# Patient Record
Sex: MALE | Race: WHITE | Employment: FULL TIME
[De-identification: names, ages, dates, MRNs, and addresses within clinical notes are randomized per-mention and may not be internally consistent; named-entity substitution may affect disease eponyms.]

---

## 2017-01-04 ENCOUNTER — HOME CARE VISIT (OUTPATIENT)
Dept: SCHEDULING | Facility: HOME HEALTH | Age: 52
End: 2017-01-04
Payer: COMMERCIAL

## 2017-01-04 PROCEDURE — G0299 HHS/HOSPICE OF RN EA 15 MIN: HCPCS

## 2017-01-08 VITALS
HEART RATE: 78 BPM | SYSTOLIC BLOOD PRESSURE: 128 MMHG | DIASTOLIC BLOOD PRESSURE: 76 MMHG | TEMPERATURE: 97.8 F | RESPIRATION RATE: 16 BRPM | OXYGEN SATURATION: 97 %

## 2017-02-13 ENCOUNTER — HOSPITAL ENCOUNTER (OUTPATIENT)
Dept: PREADMISSION TESTING | Age: 52
Discharge: HOME | End: 2017-02-13
Payer: COMMERCIAL

## 2017-02-13 VITALS
TEMPERATURE: 97.9 F | HEIGHT: 67 IN | HEART RATE: 80 BPM | DIASTOLIC BLOOD PRESSURE: 76 MMHG | WEIGHT: 264.25 LBS | SYSTOLIC BLOOD PRESSURE: 108 MMHG | BODY MASS INDEX: 41.47 KG/M2

## 2017-02-13 LAB
ABO + RH BLD: NORMAL
ANION GAP BLD CALC-SCNC: 6 MMOL/L (ref 5–15)
APPEARANCE UR: CLEAR
ATRIAL RATE: 68 BPM
BACTERIA URNS QL MICRO: NEGATIVE /HPF
BILIRUB UR QL: NEGATIVE
BLOOD GROUP ANTIBODIES SERPL: NORMAL
BUN SERPL-MCNC: 11 MG/DL (ref 6–20)
BUN/CREAT SERPL: 14 (ref 12–20)
CALCIUM SERPL-MCNC: 8.7 MG/DL (ref 8.5–10.1)
CALCULATED P AXIS, ECG09: 42 DEGREES
CALCULATED R AXIS, ECG10: 46 DEGREES
CALCULATED T AXIS, ECG11: 39 DEGREES
CHLORIDE SERPL-SCNC: 103 MMOL/L (ref 97–108)
CO2 SERPL-SCNC: 28 MMOL/L (ref 21–32)
COLOR UR: NORMAL
CREAT SERPL-MCNC: 0.78 MG/DL (ref 0.7–1.3)
DIAGNOSIS, 93000: NORMAL
EPITH CASTS URNS QL MICRO: NORMAL /LPF
ERYTHROCYTE [DISTWIDTH] IN BLOOD BY AUTOMATED COUNT: 15.1 % (ref 11.5–14.5)
EST. AVERAGE GLUCOSE BLD GHB EST-MCNC: 114 MG/DL
GLUCOSE SERPL-MCNC: 132 MG/DL (ref 65–100)
GLUCOSE UR STRIP.AUTO-MCNC: NEGATIVE MG/DL
HBA1C MFR BLD: 5.6 % (ref 4.2–6.3)
HCT VFR BLD AUTO: 40.9 % (ref 36.6–50.3)
HGB BLD-MCNC: 13.2 G/DL (ref 12.1–17)
HGB UR QL STRIP: NEGATIVE
HYALINE CASTS URNS QL MICRO: NORMAL /LPF (ref 0–5)
INR PPP: 1 (ref 0.9–1.1)
KETONES UR QL STRIP.AUTO: NEGATIVE MG/DL
LEUKOCYTE ESTERASE UR QL STRIP.AUTO: NEGATIVE
MCH RBC QN AUTO: 26.4 PG (ref 26–34)
MCHC RBC AUTO-ENTMCNC: 32.3 G/DL (ref 30–36.5)
MCV RBC AUTO: 81.8 FL (ref 80–99)
NITRITE UR QL STRIP.AUTO: NEGATIVE
P-R INTERVAL, ECG05: 166 MS
PH UR STRIP: 6.5 [PH] (ref 5–8)
PLATELET # BLD AUTO: 231 K/UL (ref 150–400)
POTASSIUM SERPL-SCNC: 3.9 MMOL/L (ref 3.5–5.1)
PROT UR STRIP-MCNC: NEGATIVE MG/DL
PROTHROMBIN TIME: 10.3 SEC (ref 9–11.1)
Q-T INTERVAL, ECG07: 414 MS
QRS DURATION, ECG06: 110 MS
QTC CALCULATION (BEZET), ECG08: 440 MS
RBC # BLD AUTO: 5 M/UL (ref 4.1–5.7)
RBC #/AREA URNS HPF: NORMAL /HPF (ref 0–5)
SODIUM SERPL-SCNC: 137 MMOL/L (ref 136–145)
SP GR UR REFRACTOMETRY: 1.02 (ref 1–1.03)
SPECIMEN EXP DATE BLD: NORMAL
UA: UC IF INDICATED,UAUC: NORMAL
UROBILINOGEN UR QL STRIP.AUTO: 0.2 EU/DL (ref 0.2–1)
VENTRICULAR RATE, ECG03: 68 BPM
WBC # BLD AUTO: 6.2 K/UL (ref 4.1–11.1)
WBC URNS QL MICRO: NORMAL /HPF (ref 0–4)

## 2017-02-13 PROCEDURE — 81001 URINALYSIS AUTO W/SCOPE: CPT | Performed by: ORTHOPAEDIC SURGERY

## 2017-02-13 PROCEDURE — 83036 HEMOGLOBIN GLYCOSYLATED A1C: CPT | Performed by: ORTHOPAEDIC SURGERY

## 2017-02-13 PROCEDURE — 85027 COMPLETE CBC AUTOMATED: CPT | Performed by: ORTHOPAEDIC SURGERY

## 2017-02-13 PROCEDURE — 93005 ELECTROCARDIOGRAM TRACING: CPT

## 2017-02-13 PROCEDURE — 80048 BASIC METABOLIC PNL TOTAL CA: CPT | Performed by: ORTHOPAEDIC SURGERY

## 2017-02-13 PROCEDURE — 85610 PROTHROMBIN TIME: CPT | Performed by: ORTHOPAEDIC SURGERY

## 2017-02-13 PROCEDURE — 86900 BLOOD TYPING SEROLOGIC ABO: CPT | Performed by: ORTHOPAEDIC SURGERY

## 2017-02-13 NOTE — PERIOP NOTES
PREOPERATIVE INSTRUCTIONS REVIEWED WITH PATIENT. PATIENT GIVEN SIX PACKS OF CHG WIPES. INSTRUCTIONS (REVIEWED) ON USE OF CHG WIPES. PATIENT GIVEN SSI INFECTION SHEET. MRSA/MMSA TREATMENT INSTRUCTION SHEET GIVEN WITH AN EXPLANATION TO PATIENT THAT THEY WILL BE NOTIFIED IF TREATMENT INSTRUCTIONS NEED TO BE INITIATED. PATIENT WAS GIVEN THE OPPORTUNITY TO ASK QUESTIONS ON THE INFORMATION PROVIDED.

## 2017-02-14 LAB
BACTERIA SPEC CULT: NORMAL
BACTERIA SPEC CULT: NORMAL
SERVICE CMNT-IMP: NORMAL

## 2017-02-16 RX ORDER — DEXAMETHASONE SODIUM PHOSPHATE 100 MG/10ML
10 INJECTION INTRAMUSCULAR; INTRAVENOUS ONCE
Status: CANCELLED | OUTPATIENT
Start: 2017-02-20 | End: 2017-02-20

## 2017-02-16 RX ORDER — CEFAZOLIN SODIUM IN 0.9 % NACL 2 G/50 ML
2 INTRAVENOUS SOLUTION, PIGGYBACK (ML) INTRAVENOUS ONCE
Status: CANCELLED | OUTPATIENT
Start: 2017-02-20 | End: 2017-02-20

## 2017-02-16 RX ORDER — CELECOXIB 200 MG/1
200 CAPSULE ORAL ONCE
Status: CANCELLED | OUTPATIENT
Start: 2017-02-20 | End: 2017-02-20

## 2017-02-16 RX ORDER — PREGABALIN 75 MG/1
75 CAPSULE ORAL ONCE
Status: CANCELLED | OUTPATIENT
Start: 2017-02-20 | End: 2017-02-20

## 2017-02-16 RX ORDER — ACETAMINOPHEN 500 MG
1000 TABLET ORAL ONCE
Status: CANCELLED | OUTPATIENT
Start: 2017-02-20 | End: 2017-02-20

## 2017-02-20 ENCOUNTER — APPOINTMENT (OUTPATIENT)
Dept: GENERAL RADIOLOGY | Age: 52
DRG: 467 | End: 2017-02-20
Attending: ORTHOPAEDIC SURGERY
Payer: COMMERCIAL

## 2017-02-20 ENCOUNTER — HOSPITAL ENCOUNTER (INPATIENT)
Age: 52
LOS: 1 days | Discharge: HOME | DRG: 467 | End: 2017-02-21
Attending: ORTHOPAEDIC SURGERY | Admitting: ORTHOPAEDIC SURGERY
Payer: COMMERCIAL

## 2017-02-20 ENCOUNTER — ANESTHESIA (OUTPATIENT)
Dept: SURGERY | Age: 52
DRG: 467 | End: 2017-02-20
Payer: COMMERCIAL

## 2017-02-20 ENCOUNTER — ANESTHESIA EVENT (OUTPATIENT)
Dept: SURGERY | Age: 52
DRG: 467 | End: 2017-02-20
Payer: COMMERCIAL

## 2017-02-20 PROBLEM — T84.51XA INFECTION OF RIGHT PROSTHETIC HIP JOINT (HCC): Status: ACTIVE | Noted: 2017-02-20

## 2017-02-20 LAB
GLUCOSE BLD STRIP.AUTO-MCNC: 89 MG/DL (ref 65–100)
SERVICE CMNT-IMP: NORMAL

## 2017-02-20 PROCEDURE — 77030031139 HC SUT VCRL2 J&J -A: Performed by: ORTHOPAEDIC SURGERY

## 2017-02-20 PROCEDURE — C1776 JOINT DEVICE (IMPLANTABLE): HCPCS | Performed by: ORTHOPAEDIC SURGERY

## 2017-02-20 PROCEDURE — 74011250637 HC RX REV CODE- 250/637: Performed by: ORTHOPAEDIC SURGERY

## 2017-02-20 PROCEDURE — 65270000029 HC RM PRIVATE

## 2017-02-20 PROCEDURE — 77030012935 HC DRSG AQUACEL BMS -B: Performed by: ORTHOPAEDIC SURGERY

## 2017-02-20 PROCEDURE — 74011250636 HC RX REV CODE- 250/636

## 2017-02-20 PROCEDURE — 73501 X-RAY EXAM HIP UNI 1 VIEW: CPT

## 2017-02-20 PROCEDURE — 77030007866 HC KT SPN ANES BBMI -B: Performed by: ANESTHESIOLOGY

## 2017-02-20 PROCEDURE — 77030020788: Performed by: ORTHOPAEDIC SURGERY

## 2017-02-20 PROCEDURE — 87205 SMEAR GRAM STAIN: CPT | Performed by: ORTHOPAEDIC SURGERY

## 2017-02-20 PROCEDURE — C9290 INJ, BUPIVACAINE LIPOSOME: HCPCS | Performed by: ORTHOPAEDIC SURGERY

## 2017-02-20 PROCEDURE — 74011000258 HC RX REV CODE- 258: Performed by: ORTHOPAEDIC SURGERY

## 2017-02-20 PROCEDURE — 74011250636 HC RX REV CODE- 250/636: Performed by: ANESTHESIOLOGY

## 2017-02-20 PROCEDURE — 77030018846 HC SOL IRR STRL H20 ICUM -A: Performed by: ORTHOPAEDIC SURGERY

## 2017-02-20 PROCEDURE — 77030020061 HC IV BLD WRMR ADMIN SET 3M -B: Performed by: ANESTHESIOLOGY

## 2017-02-20 PROCEDURE — 74011000250 HC RX REV CODE- 250

## 2017-02-20 PROCEDURE — 74011250636 HC RX REV CODE- 250/636: Performed by: ORTHOPAEDIC SURGERY

## 2017-02-20 PROCEDURE — 87075 CULTR BACTERIA EXCEPT BLOOD: CPT | Performed by: ORTHOPAEDIC SURGERY

## 2017-02-20 PROCEDURE — 77030011640 HC PAD GRND REM COVD -A: Performed by: ORTHOPAEDIC SURGERY

## 2017-02-20 PROCEDURE — 77030012406 HC DRN WND PENRS BARD -A: Performed by: ORTHOPAEDIC SURGERY

## 2017-02-20 PROCEDURE — 77030008467 HC STPLR SKN COVD -B: Performed by: ORTHOPAEDIC SURGERY

## 2017-02-20 PROCEDURE — 77030013079 HC BLNKT BAIR HGGR 3M -A: Performed by: ANESTHESIOLOGY

## 2017-02-20 PROCEDURE — 0SR90JA REPLACEMENT OF RIGHT HIP JOINT WITH SYNTHETIC SUBSTITUTE, UNCEMENTED, OPEN APPROACH: ICD-10-PCS | Performed by: ORTHOPAEDIC SURGERY

## 2017-02-20 PROCEDURE — 74011000250 HC RX REV CODE- 250: Performed by: ORTHOPAEDIC SURGERY

## 2017-02-20 PROCEDURE — 0SP908Z REMOVAL OF SPACER FROM RIGHT HIP JOINT, OPEN APPROACH: ICD-10-PCS | Performed by: ORTHOPAEDIC SURGERY

## 2017-02-20 PROCEDURE — 77030004473: Performed by: ORTHOPAEDIC SURGERY

## 2017-02-20 PROCEDURE — 77030010507 HC ADH SKN DERMBND J&J -B: Performed by: ORTHOPAEDIC SURGERY

## 2017-02-20 PROCEDURE — 77030006783 HC BLD SAW OSC MCRA -A: Performed by: ORTHOPAEDIC SURGERY

## 2017-02-20 PROCEDURE — 76210000006 HC OR PH I REC 0.5 TO 1 HR: Performed by: ORTHOPAEDIC SURGERY

## 2017-02-20 PROCEDURE — 76060000037 HC ANESTHESIA 3 TO 3.5 HR: Performed by: ORTHOPAEDIC SURGERY

## 2017-02-20 PROCEDURE — 76010000173 HC OR TIME 3 TO 3.5 HR INTENSV-TIER 1: Performed by: ORTHOPAEDIC SURGERY

## 2017-02-20 PROCEDURE — 77030018836 HC SOL IRR NACL ICUM -A: Performed by: ORTHOPAEDIC SURGERY

## 2017-02-20 PROCEDURE — 77030034850: Performed by: ORTHOPAEDIC SURGERY

## 2017-02-20 PROCEDURE — 82962 GLUCOSE BLOOD TEST: CPT

## 2017-02-20 PROCEDURE — 77030018074 HC RTVR SUT ARTH4 S&N -B: Performed by: ORTHOPAEDIC SURGERY

## 2017-02-20 PROCEDURE — 77030032490 HC SLV COMPR SCD KNE COVD -B

## 2017-02-20 PROCEDURE — 77030011264 HC ELECTRD BLD EXT COVD -A: Performed by: ORTHOPAEDIC SURGERY

## 2017-02-20 DEVICE — SCREW BNE L25MM DIA6.5MM STD CANC HIP TI ST CANN NONLOCKING: Type: IMPLANTABLE DEVICE | Site: HIP | Status: FUNCTIONAL

## 2017-02-20 DEVICE — HEAD FEM DELT V40 -2.5MM 36MM -- V40 BIOLOX: Type: IMPLANTABLE DEVICE | Site: HIP | Status: FUNCTIONAL

## 2017-02-20 RX ORDER — TRANEXAMIC ACID 100 MG/ML
INJECTION, SOLUTION INTRAVENOUS AS NEEDED
Status: DISCONTINUED | OUTPATIENT
Start: 2017-02-20 | End: 2017-02-20 | Stop reason: HOSPADM

## 2017-02-20 RX ORDER — FAMOTIDINE 20 MG/1
20 TABLET, FILM COATED ORAL 2 TIMES DAILY
Status: DISCONTINUED | OUTPATIENT
Start: 2017-02-20 | End: 2017-02-21 | Stop reason: HOSPADM

## 2017-02-20 RX ORDER — OXYCODONE HYDROCHLORIDE 5 MG/1
5-10 TABLET ORAL
Status: DISCONTINUED | OUTPATIENT
Start: 2017-02-20 | End: 2017-02-21 | Stop reason: HOSPADM

## 2017-02-20 RX ORDER — PREGABALIN 75 MG/1
75 CAPSULE ORAL ONCE
Status: COMPLETED | OUTPATIENT
Start: 2017-02-20 | End: 2017-02-20

## 2017-02-20 RX ORDER — BUPIVACAINE HYDROCHLORIDE 5 MG/ML
INJECTION, SOLUTION EPIDURAL; INTRACAUDAL AS NEEDED
Status: DISCONTINUED | OUTPATIENT
Start: 2017-02-20 | End: 2017-02-20 | Stop reason: HOSPADM

## 2017-02-20 RX ORDER — SODIUM CHLORIDE 0.9 % (FLUSH) 0.9 %
5-10 SYRINGE (ML) INJECTION AS NEEDED
Status: DISCONTINUED | OUTPATIENT
Start: 2017-02-20 | End: 2017-02-20 | Stop reason: HOSPADM

## 2017-02-20 RX ORDER — KETOROLAC TROMETHAMINE 30 MG/ML
15 INJECTION, SOLUTION INTRAMUSCULAR; INTRAVENOUS EVERY 6 HOURS
Status: DISCONTINUED | OUTPATIENT
Start: 2017-02-20 | End: 2017-02-21 | Stop reason: HOSPADM

## 2017-02-20 RX ORDER — SODIUM CHLORIDE 9 MG/ML
125 INJECTION, SOLUTION INTRAVENOUS CONTINUOUS
Status: DISCONTINUED | OUTPATIENT
Start: 2017-02-20 | End: 2017-02-21 | Stop reason: HOSPADM

## 2017-02-20 RX ORDER — SODIUM CHLORIDE, SODIUM LACTATE, POTASSIUM CHLORIDE, CALCIUM CHLORIDE 600; 310; 30; 20 MG/100ML; MG/100ML; MG/100ML; MG/100ML
125 INJECTION, SOLUTION INTRAVENOUS CONTINUOUS
Status: DISCONTINUED | OUTPATIENT
Start: 2017-02-20 | End: 2017-02-20 | Stop reason: HOSPADM

## 2017-02-20 RX ORDER — HYDROXYZINE HYDROCHLORIDE 10 MG/1
10 TABLET, FILM COATED ORAL
Status: DISCONTINUED | OUTPATIENT
Start: 2017-02-20 | End: 2017-02-21 | Stop reason: HOSPADM

## 2017-02-20 RX ORDER — HYDROMORPHONE HYDROCHLORIDE 1 MG/ML
0.5 INJECTION, SOLUTION INTRAMUSCULAR; INTRAVENOUS; SUBCUTANEOUS
Status: DISCONTINUED | OUTPATIENT
Start: 2017-02-20 | End: 2017-02-21 | Stop reason: HOSPADM

## 2017-02-20 RX ORDER — POLYETHYLENE GLYCOL 3350 17 G/17G
17 POWDER, FOR SOLUTION ORAL DAILY
Status: DISCONTINUED | OUTPATIENT
Start: 2017-02-21 | End: 2017-02-21 | Stop reason: HOSPADM

## 2017-02-20 RX ORDER — PROPOFOL 10 MG/ML
INJECTION, EMULSION INTRAVENOUS AS NEEDED
Status: DISCONTINUED | OUTPATIENT
Start: 2017-02-20 | End: 2017-02-20 | Stop reason: HOSPADM

## 2017-02-20 RX ORDER — SODIUM CHLORIDE 0.9 % (FLUSH) 0.9 %
5-10 SYRINGE (ML) INJECTION AS NEEDED
Status: DISCONTINUED | OUTPATIENT
Start: 2017-02-20 | End: 2017-02-21 | Stop reason: HOSPADM

## 2017-02-20 RX ORDER — DEXAMETHASONE SODIUM PHOSPHATE 4 MG/ML
INJECTION, SOLUTION INTRA-ARTICULAR; INTRALESIONAL; INTRAMUSCULAR; INTRAVENOUS; SOFT TISSUE AS NEEDED
Status: DISCONTINUED | OUTPATIENT
Start: 2017-02-20 | End: 2017-02-20 | Stop reason: HOSPADM

## 2017-02-20 RX ORDER — EPINEPHRINE 1 MG/ML
INJECTION, SOLUTION, CONCENTRATE INTRAVENOUS AS NEEDED
Status: DISCONTINUED | OUTPATIENT
Start: 2017-02-20 | End: 2017-02-20 | Stop reason: HOSPADM

## 2017-02-20 RX ORDER — PHENYLEPHRINE HCL IN 0.9% NACL 0.4MG/10ML
SYRINGE (ML) INTRAVENOUS AS NEEDED
Status: DISCONTINUED | OUTPATIENT
Start: 2017-02-20 | End: 2017-02-20 | Stop reason: HOSPADM

## 2017-02-20 RX ORDER — MIDAZOLAM HYDROCHLORIDE 1 MG/ML
1 INJECTION, SOLUTION INTRAMUSCULAR; INTRAVENOUS AS NEEDED
Status: COMPLETED | OUTPATIENT
Start: 2017-02-20 | End: 2017-02-20

## 2017-02-20 RX ORDER — FENTANYL CITRATE 50 UG/ML
25 INJECTION, SOLUTION INTRAMUSCULAR; INTRAVENOUS
Status: DISCONTINUED | OUTPATIENT
Start: 2017-02-20 | End: 2017-02-20 | Stop reason: HOSPADM

## 2017-02-20 RX ORDER — SODIUM CHLORIDE 9 MG/ML
1000 INJECTION, SOLUTION INTRAVENOUS CONTINUOUS
Status: DISCONTINUED | OUTPATIENT
Start: 2017-02-20 | End: 2017-02-20 | Stop reason: HOSPADM

## 2017-02-20 RX ORDER — SODIUM CHLORIDE, SODIUM LACTATE, POTASSIUM CHLORIDE, CALCIUM CHLORIDE 600; 310; 30; 20 MG/100ML; MG/100ML; MG/100ML; MG/100ML
INJECTION, SOLUTION INTRAVENOUS
Status: DISCONTINUED | OUTPATIENT
Start: 2017-02-20 | End: 2017-02-20 | Stop reason: HOSPADM

## 2017-02-20 RX ORDER — SODIUM CHLORIDE 9 MG/ML
50 INJECTION, SOLUTION INTRAVENOUS CONTINUOUS
Status: DISCONTINUED | OUTPATIENT
Start: 2017-02-20 | End: 2017-02-20 | Stop reason: HOSPADM

## 2017-02-20 RX ORDER — MIDAZOLAM HYDROCHLORIDE 1 MG/ML
0.5 INJECTION, SOLUTION INTRAMUSCULAR; INTRAVENOUS
Status: DISCONTINUED | OUTPATIENT
Start: 2017-02-20 | End: 2017-02-20 | Stop reason: HOSPADM

## 2017-02-20 RX ORDER — ASPIRIN 325 MG
325 TABLET, DELAYED RELEASE (ENTERIC COATED) ORAL 2 TIMES DAILY
Status: DISCONTINUED | OUTPATIENT
Start: 2017-02-20 | End: 2017-02-21 | Stop reason: HOSPADM

## 2017-02-20 RX ORDER — LIDOCAINE HYDROCHLORIDE 20 MG/ML
INJECTION, SOLUTION EPIDURAL; INFILTRATION; INTRACAUDAL; PERINEURAL AS NEEDED
Status: DISCONTINUED | OUTPATIENT
Start: 2017-02-20 | End: 2017-02-20 | Stop reason: HOSPADM

## 2017-02-20 RX ORDER — ONDANSETRON 2 MG/ML
4 INJECTION INTRAMUSCULAR; INTRAVENOUS AS NEEDED
Status: DISCONTINUED | OUTPATIENT
Start: 2017-02-20 | End: 2017-02-20 | Stop reason: HOSPADM

## 2017-02-20 RX ORDER — CELECOXIB 200 MG/1
200 CAPSULE ORAL 2 TIMES DAILY
Status: DISCONTINUED | OUTPATIENT
Start: 2017-02-21 | End: 2017-02-21 | Stop reason: HOSPADM

## 2017-02-20 RX ORDER — DIPHENHYDRAMINE HYDROCHLORIDE 50 MG/ML
12.5 INJECTION, SOLUTION INTRAMUSCULAR; INTRAVENOUS AS NEEDED
Status: DISCONTINUED | OUTPATIENT
Start: 2017-02-20 | End: 2017-02-20 | Stop reason: HOSPADM

## 2017-02-20 RX ORDER — LIDOCAINE HYDROCHLORIDE 10 MG/ML
0.1 INJECTION, SOLUTION EPIDURAL; INFILTRATION; INTRACAUDAL; PERINEURAL AS NEEDED
Status: DISCONTINUED | OUTPATIENT
Start: 2017-02-20 | End: 2017-02-20 | Stop reason: HOSPADM

## 2017-02-20 RX ORDER — MIDAZOLAM HYDROCHLORIDE 1 MG/ML
1 INJECTION, SOLUTION INTRAMUSCULAR; INTRAVENOUS AS NEEDED
Status: DISCONTINUED | OUTPATIENT
Start: 2017-02-20 | End: 2017-02-20 | Stop reason: HOSPADM

## 2017-02-20 RX ORDER — ATROPINE SULFATE 0.4 MG/ML
INJECTION, SOLUTION ENDOTRACHEAL; INTRAMEDULLARY; INTRAMUSCULAR; INTRAVENOUS; SUBCUTANEOUS AS NEEDED
Status: DISCONTINUED | OUTPATIENT
Start: 2017-02-20 | End: 2017-02-20 | Stop reason: HOSPADM

## 2017-02-20 RX ORDER — PROPOFOL 10 MG/ML
INJECTION, EMULSION INTRAVENOUS
Status: DISCONTINUED | OUTPATIENT
Start: 2017-02-20 | End: 2017-02-20 | Stop reason: HOSPADM

## 2017-02-20 RX ORDER — DEXAMETHASONE SODIUM PHOSPHATE 4 MG/ML
10 INJECTION, SOLUTION INTRA-ARTICULAR; INTRALESIONAL; INTRAMUSCULAR; INTRAVENOUS; SOFT TISSUE ONCE
Status: DISCONTINUED | OUTPATIENT
Start: 2017-02-21 | End: 2017-02-21 | Stop reason: HOSPADM

## 2017-02-20 RX ORDER — ONDANSETRON 2 MG/ML
INJECTION INTRAMUSCULAR; INTRAVENOUS AS NEEDED
Status: DISCONTINUED | OUTPATIENT
Start: 2017-02-20 | End: 2017-02-20 | Stop reason: HOSPADM

## 2017-02-20 RX ORDER — ONDANSETRON 2 MG/ML
4 INJECTION INTRAMUSCULAR; INTRAVENOUS
Status: DISCONTINUED | OUTPATIENT
Start: 2017-02-20 | End: 2017-02-21 | Stop reason: HOSPADM

## 2017-02-20 RX ORDER — ACETAMINOPHEN 325 MG/1
650 TABLET ORAL EVERY 6 HOURS
Status: DISCONTINUED | OUTPATIENT
Start: 2017-02-20 | End: 2017-02-21 | Stop reason: HOSPADM

## 2017-02-20 RX ORDER — CELECOXIB 200 MG/1
200 CAPSULE ORAL ONCE
Status: COMPLETED | OUTPATIENT
Start: 2017-02-20 | End: 2017-02-20

## 2017-02-20 RX ORDER — MORPHINE SULFATE 10 MG/ML
2 INJECTION, SOLUTION INTRAMUSCULAR; INTRAVENOUS
Status: DISCONTINUED | OUTPATIENT
Start: 2017-02-20 | End: 2017-02-20 | Stop reason: HOSPADM

## 2017-02-20 RX ORDER — CEFAZOLIN SODIUM IN 0.9 % NACL 2 G/50 ML
2 INTRAVENOUS SOLUTION, PIGGYBACK (ML) INTRAVENOUS EVERY 8 HOURS
Status: COMPLETED | OUTPATIENT
Start: 2017-02-20 | End: 2017-02-21

## 2017-02-20 RX ORDER — ACETAMINOPHEN 500 MG
1000 TABLET ORAL ONCE
Status: COMPLETED | OUTPATIENT
Start: 2017-02-20 | End: 2017-02-20

## 2017-02-20 RX ORDER — DEXAMETHASONE SODIUM PHOSPHATE 10 MG/ML
10 INJECTION INTRAMUSCULAR; INTRAVENOUS ONCE
Status: DISCONTINUED | OUTPATIENT
Start: 2017-02-20 | End: 2017-02-20 | Stop reason: HOSPADM

## 2017-02-20 RX ORDER — OXYCODONE AND ACETAMINOPHEN 5; 325 MG/1; MG/1
1 TABLET ORAL AS NEEDED
Status: DISCONTINUED | OUTPATIENT
Start: 2017-02-20 | End: 2017-02-20 | Stop reason: HOSPADM

## 2017-02-20 RX ORDER — OXYCODONE HYDROCHLORIDE 5 MG/1
5 TABLET ORAL
Status: DISCONTINUED | OUTPATIENT
Start: 2017-02-20 | End: 2017-02-20

## 2017-02-20 RX ORDER — SODIUM CHLORIDE 0.9 % (FLUSH) 0.9 %
5-10 SYRINGE (ML) INJECTION EVERY 8 HOURS
Status: DISCONTINUED | OUTPATIENT
Start: 2017-02-20 | End: 2017-02-20 | Stop reason: HOSPADM

## 2017-02-20 RX ORDER — HYDROMORPHONE HYDROCHLORIDE 1 MG/ML
0.2 INJECTION, SOLUTION INTRAMUSCULAR; INTRAVENOUS; SUBCUTANEOUS
Status: DISCONTINUED | OUTPATIENT
Start: 2017-02-20 | End: 2017-02-20 | Stop reason: HOSPADM

## 2017-02-20 RX ORDER — FENTANYL CITRATE 50 UG/ML
50 INJECTION, SOLUTION INTRAMUSCULAR; INTRAVENOUS AS NEEDED
Status: DISCONTINUED | OUTPATIENT
Start: 2017-02-20 | End: 2017-02-20 | Stop reason: HOSPADM

## 2017-02-20 RX ORDER — CEFAZOLIN SODIUM IN 0.9 % NACL 2 G/50 ML
2 INTRAVENOUS SOLUTION, PIGGYBACK (ML) INTRAVENOUS ONCE
Status: COMPLETED | OUTPATIENT
Start: 2017-02-20 | End: 2017-02-20

## 2017-02-20 RX ORDER — POVIDONE-IODINE 10 %
SOLUTION, NON-ORAL TOPICAL AS NEEDED
Status: DISCONTINUED | OUTPATIENT
Start: 2017-02-20 | End: 2017-02-20 | Stop reason: HOSPADM

## 2017-02-20 RX ORDER — FACIAL-BODY WIPES
10 EACH TOPICAL DAILY PRN
Status: DISCONTINUED | OUTPATIENT
Start: 2017-02-22 | End: 2017-02-21 | Stop reason: HOSPADM

## 2017-02-20 RX ORDER — SODIUM CHLORIDE 0.9 % (FLUSH) 0.9 %
5-10 SYRINGE (ML) INJECTION EVERY 8 HOURS
Status: DISCONTINUED | OUTPATIENT
Start: 2017-02-21 | End: 2017-02-21 | Stop reason: HOSPADM

## 2017-02-20 RX ORDER — AMOXICILLIN 250 MG
1 CAPSULE ORAL 2 TIMES DAILY
Status: DISCONTINUED | OUTPATIENT
Start: 2017-02-20 | End: 2017-02-21 | Stop reason: HOSPADM

## 2017-02-20 RX ORDER — NALOXONE HYDROCHLORIDE 0.4 MG/ML
0.4 INJECTION, SOLUTION INTRAMUSCULAR; INTRAVENOUS; SUBCUTANEOUS AS NEEDED
Status: DISCONTINUED | OUTPATIENT
Start: 2017-02-20 | End: 2017-02-21 | Stop reason: HOSPADM

## 2017-02-20 RX ADMIN — CELECOXIB 200 MG: 200 CAPSULE ORAL at 09:09

## 2017-02-20 RX ADMIN — CEFAZOLIN 2 G: 1 INJECTION, POWDER, FOR SOLUTION INTRAMUSCULAR; INTRAVENOUS; PARENTERAL at 11:04

## 2017-02-20 RX ADMIN — MIDAZOLAM HYDROCHLORIDE 1 MG: 1 INJECTION, SOLUTION INTRAMUSCULAR; INTRAVENOUS at 10:53

## 2017-02-20 RX ADMIN — ACETAMINOPHEN 650 MG: 325 TABLET, FILM COATED ORAL at 18:03

## 2017-02-20 RX ADMIN — ATROPINE SULFATE 0.2 MG: 0.4 INJECTION, SOLUTION ENDOTRACHEAL; INTRAMEDULLARY; INTRAMUSCULAR; INTRAVENOUS; SUBCUTANEOUS at 11:46

## 2017-02-20 RX ADMIN — PROPOFOL 40 MCG/KG/MIN: 10 INJECTION, EMULSION INTRAVENOUS at 11:02

## 2017-02-20 RX ADMIN — PREGABALIN 75 MG: 75 CAPSULE ORAL at 09:09

## 2017-02-20 RX ADMIN — BUPIVACAINE HYDROCHLORIDE 15 MG: 5 INJECTION, SOLUTION EPIDURAL; INTRACAUDAL at 11:10

## 2017-02-20 RX ADMIN — Medication 80 MCG: at 11:14

## 2017-02-20 RX ADMIN — MIDAZOLAM HYDROCHLORIDE 1 MG: 1 INJECTION, SOLUTION INTRAMUSCULAR; INTRAVENOUS at 10:56

## 2017-02-20 RX ADMIN — ASPIRIN 325 MG: 325 TABLET, DELAYED RELEASE ORAL at 18:03

## 2017-02-20 RX ADMIN — ACETAMINOPHEN 1000 MG: 500 TABLET, FILM COATED ORAL at 09:09

## 2017-02-20 RX ADMIN — MIDAZOLAM HYDROCHLORIDE 1 MG: 1 INJECTION, SOLUTION INTRAMUSCULAR; INTRAVENOUS at 10:55

## 2017-02-20 RX ADMIN — KETOROLAC TROMETHAMINE 15 MG: 30 INJECTION, SOLUTION INTRAMUSCULAR at 18:58

## 2017-02-20 RX ADMIN — SODIUM CHLORIDE, SODIUM LACTATE, POTASSIUM CHLORIDE, CALCIUM CHLORIDE: 600; 310; 30; 20 INJECTION, SOLUTION INTRAVENOUS at 10:55

## 2017-02-20 RX ADMIN — Medication 120 MCG: at 11:24

## 2017-02-20 RX ADMIN — DOCUSATE SODIUM AND SENNOSIDES 1 TABLET: 8.6; 5 TABLET, FILM COATED ORAL at 18:03

## 2017-02-20 RX ADMIN — PROPOFOL 25 MG: 10 INJECTION, EMULSION INTRAVENOUS at 11:00

## 2017-02-20 RX ADMIN — PROPOFOL 40 MG: 10 INJECTION, EMULSION INTRAVENOUS at 11:16

## 2017-02-20 RX ADMIN — PROPOFOL 30 MG: 10 INJECTION, EMULSION INTRAVENOUS at 13:33

## 2017-02-20 RX ADMIN — PROPOFOL 50 MG: 10 INJECTION, EMULSION INTRAVENOUS at 10:58

## 2017-02-20 RX ADMIN — CEFAZOLIN 2 G: 1 INJECTION, POWDER, FOR SOLUTION INTRAMUSCULAR; INTRAVENOUS; PARENTERAL at 18:58

## 2017-02-20 RX ADMIN — TRANEXAMIC ACID 1 G: 100 INJECTION, SOLUTION INTRAVENOUS at 13:25

## 2017-02-20 RX ADMIN — SODIUM CHLORIDE, SODIUM LACTATE, POTASSIUM CHLORIDE, CALCIUM CHLORIDE: 600; 310; 30; 20 INJECTION, SOLUTION INTRAVENOUS at 13:35

## 2017-02-20 RX ADMIN — DEXAMETHASONE SODIUM PHOSPHATE 10 MG: 4 INJECTION, SOLUTION INTRA-ARTICULAR; INTRALESIONAL; INTRAMUSCULAR; INTRAVENOUS; SOFT TISSUE at 11:20

## 2017-02-20 RX ADMIN — LIDOCAINE HYDROCHLORIDE 100 MG: 20 INJECTION, SOLUTION EPIDURAL; INFILTRATION; INTRACAUDAL; PERINEURAL at 10:58

## 2017-02-20 RX ADMIN — OXYCODONE HYDROCHLORIDE 5 MG: 5 TABLET ORAL at 19:30

## 2017-02-20 RX ADMIN — ONDANSETRON 4 MG: 2 INJECTION INTRAMUSCULAR; INTRAVENOUS at 13:24

## 2017-02-20 RX ADMIN — SODIUM CHLORIDE, SODIUM LACTATE, POTASSIUM CHLORIDE, AND CALCIUM CHLORIDE 125 ML/HR: 600; 310; 30; 20 INJECTION, SOLUTION INTRAVENOUS at 09:01

## 2017-02-20 RX ADMIN — TRANEXAMIC ACID 1 G: 100 INJECTION, SOLUTION INTRAVENOUS at 11:15

## 2017-02-20 RX ADMIN — PROPOFOL 30 MG: 10 INJECTION, EMULSION INTRAVENOUS at 11:03

## 2017-02-20 RX ADMIN — OXYCODONE HYDROCHLORIDE 10 MG: 5 TABLET ORAL at 22:30

## 2017-02-20 RX ADMIN — SODIUM CHLORIDE 125 ML/HR: 900 INJECTION, SOLUTION INTRAVENOUS at 14:40

## 2017-02-20 RX ADMIN — OXYCODONE HYDROCHLORIDE 5 MG: 5 TABLET ORAL at 18:03

## 2017-02-20 RX ADMIN — SODIUM CHLORIDE, SODIUM LACTATE, POTASSIUM CHLORIDE, CALCIUM CHLORIDE: 600; 310; 30; 20 INJECTION, SOLUTION INTRAVENOUS at 12:00

## 2017-02-20 RX ADMIN — ATROPINE SULFATE 0.2 MG: 0.4 INJECTION, SOLUTION ENDOTRACHEAL; INTRAMEDULLARY; INTRAMUSCULAR; INTRAVENOUS; SUBCUTANEOUS at 12:00

## 2017-02-20 RX ADMIN — PROPOFOL 25 MG: 10 INJECTION, EMULSION INTRAVENOUS at 10:57

## 2017-02-20 NOTE — IP AVS SNAPSHOT
[address] 
[phone] Patient: Carlene Chin MRN: [id number] :1965 You are allergic to the following No active allergies Recent Documentation Height Weight BMI Smoking Status 1.702 m 119.9 kg 41.39 kg/m2 Current Every Day Smoker Unresulted Labs Order Current Status CULTURE, ANAEROBIC In process CULTURE, WOUND W GRAM STAIN Preliminary result Emergency Contacts Name Discharge Info Relation Home Work Mobile [address] [facility] CAREGIVER [3] Spouse [3] [phone] About your hospitalization You were admitted on:  2017 You last received care in the:  [facility] You were discharged on:  2017 Unit phone number:  [phone] Why you were hospitalized Your primary diagnosis was:  Not on File Your diagnoses also included:  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 
[phone] Current Discharge Medication List  
  
START taking these medications Dose & Instructions Dispensing Information Comments Morning Noon Evening Bedtime  
 aspirin delayed-release 325 mg tablet Your next dose is: Today, Tomorrow Other:  _________ Dose:  325 mg Take 1 Tab by mouth two (2) times a day. Quantity:  60 Tab Refills:  0  
     
   
   
   
  
 diclofenac EC 75 mg EC tablet Commonly known as:  VOLTAREN Your next dose is: Today, Tomorrow Other:  _________ Dose:  75 mg Take 1 Tab by mouth two (2) times a day. Quantity:  60 Tab Refills:  0  
     
   
   
   
  
 oxyCODONE IR 5 mg immediate release tablet Commonly known as:  Villa Jacques Your next dose is: Today, Tomorrow Other:  _________ Dose:  5-10 mg Take 1-2 Tabs by mouth every four (4) hours as needed. Max Daily Amount: 60 mg.  
 Quantity:  80 Tab Refills:  0 CONTINUE these medications which have NOT CHANGED Dose & Instructions Dispensing Information Comments Morning Noon Evening Bedtime OTHER Your next dose is: Today, Tomorrow Other:  _________ JUICE AND VEGETABLE PLUS 4 CAPS DAILY( HERBAL MED) Refills:  0 Where to Get Your Medications Information on where to get these meds will be given to you by the nurse or doctor. ! Ask your nurse or doctor about these medications  
  aspirin delayed-release 325 mg tablet  
 diclofenac EC 75 mg EC tablet  
 oxyCODONE IR 5 mg immediate release tablet Discharge Instructions After Hospital Care Plan:  Discharge Instructions Posterior Approach Hip Replacement-Dr. Francis Mirza Patient Name: Mikey Smith Date of procedure:2/20/2017 Procedure:Procedure(s): RIGHT TOTAL HIP REVISION ARTHROPLASTY POSTERIOR APPROACH Surgeon: Surgeon(s) and Role: Arminda Godinez MD - Primary PCP: Seble Márquez MD 
Date of discharge: No discharge date for patient encounter. Follow up appointments 
-follow up with Dr. Francis Mirza in 4 weeks. Call [phone] to make an appointment. Home Health Agency: _______________________   phone: _____________________ The agency will contact you to arrange dates/times for visits.   Please call them if you do not hear from them within 24 hours after you are discharged When to call your Orthopaedic Surgeon: 
-Signs of hip dislocation-increased hip pain, unrelieved pain or if you have difficulty or are unable to walk 
-Signs of infection-if your incision is red; continues to have drainage; drainage has a foul odor or if you have a persistent fever over 101 degrees 
-Signs of a blood clot in your leg-calf pain, tenderness, redness, swelling of lower leg When to call your Primary Care Physician: 
-Concerns about medical conditions such as diabetes, high blood pressure, asthma, congestive heart failure 
-Call if blood sugars are elevated, persistent headache or dizziness, coughing or congestion, constipation or diarrhea, burning with urination, abnormal heart rate When to call 911 and go to the nearest emergency room 
-acute onset of chest pain, shortness of breath, difficulty breathing Activity 
- weight bearing as tolerated with walker or crutches. Refer to pages 26-36 of your handbook for instructions and pictures 
-20 repetitions of each exercise 3 times each day as instructed by the physical therapist.  Refer to pages 38-45 of our handbook for instructions and pictures 
-get up every one hour and walk (except at night when sleeping) - Maintain hip dislocation precautions for 6 weeks. Refer to pages 23-25 of your handbook for instructions and pictures 
 -do not cross your legs 
 -do not roll your surgery leg inward 
 -do not bend more than 90 degrees 
-Do not drive or operate heavy machinery Incision Care 
-the Aquacel (brown, waterproof) surgical dressing is to remain on your hip for 7 days. On the 7th day have someone gently peel the dressing off by carefully lifting the edge and stretching it slightly to break the adhesive seal and leave incision open to air. You may take a shower with the Aquacel dressing in place. Preventing blood clots -Take Aspirin 325 mg twice daily as prescribed  by Dr. Matthew Márquez for one month following surgery Pain management - Please take scheduled 650 mg tylenol every 6 hours for 2 weeks - Please take scheduled celebrex 200 mg twice daily for 2 weeks - Please take tramadol every 6 hours for pain as needed for pain. - For breakthrough please take 5-10 mg oxycodone - Avoid alcoholic beverages while taking pain medication - Do not take any over-the-counter medication for pain except Tylenol (acetaminophen) - Please be aware that many medications contain Tylenol. We do not want you to over medicate so please read the information below as a guide. Do not take more than 4 Grams of Tylenol in a 24 hour - You may place an ice bag on your knee for 15-20 minutes after exercising and as needed throughout the day and night Diet 
-resume usual diet; drink plenty of fluids; eat foods high in fiber 
-you may want to take a stool softener (such as Senokot-S or Colace) to prevent constipation while you are taking pain medication. If constipation occurs, take a laxative (such as Dulcolax tablets, Milk of Magnesia, or a suppository) [facility] Care [facility] Nursing-per physicians order  
-remove staples 10-14 days post op and apply steri-strips 
-remove Aquacel dressing at 7 days post-op 
-complete head to toe assessment, vital signs 
-medication reconciliation 
-review pain management 
-manage chronic medical conditions Physical Therapy-per physicians order Weight bearing status: 
Precautions at Admission: Fall, WBAT Right Side Weight Bearing: As tolerated Mobility Status: 
  
Sit to Stand: Modified independent Gait: 
Distance (ft): 330 Feet (ft) Ambulation - Level of Assistance: Supervision Assistive Device: Walker, rolling Gait Abnormalities: Antalgic ADL status overall composite: 
  
  
  
  
  
Physical Therapy -assessment and evaluation-bed mobility; functional transfers (bed, chair, bathroom, stairs); ambulation with equipment, car transfers, safety and ability to get out of house in the event of an emergency 
-review and maintain routine hip precautions x 6 weeks;  weight bearing as tolerated 
-discuss pain management 
-review how to do ADLs. Refer to pages 46-50 of handbook 
 
-Home Exercise Program-refer to pages 38-45 of handbook 
-supine exercises-quad sets, hamstring sets, gluteal sets, hip abduction/adduction slides, hip external rotation, heel slides (flexing the knee) -sitting exercises-ankle pumps, bicep curls (use weights as appropriate), triceps pushups, shoulder flexion (use weights as appropriate) -standing exercises holding onto supportive counter-toe raises, heel raises, mini-squats, heel/toe touches with knee bend, marching in place, hip abduction/adduction, hip external rotation, hip extension, hip abduction/extension/external rotation (concentration on gluteus esther), heel toe taps Physical therapy goals by discharge from [address] [place] [address] Drive ambulation with walker, crutches or cane if needed (level surfaces and   stairs) -Independent with knowledge and adherence to hip precautions 
-Daily performance of home exercise program 
-Independent with stair climbing and car transfers 
-Progress to community ambulator (least restrictive assistive device) Discharge Orders None Introducing [facility]! Dear Frieda Salguero: Thank you for requesting a [employer] account. Our records indicate that you already have an active [employer] account. You can access your account anytime at [email or web address] Did you know that you can access your hospital and ER discharge instructions at any time in [employer]? You can also review all of your test results from your hospital stay or ER visit. Additional Information If you have questions, please visit the Frequently Asked Questions section of the MyChart website at [email or web address] Remember, MyChart is NOT to be used for urgent needs. For medical emergencies, dial 911. Now available from your iPhone and Android! General Information Please provide this summary of care documentation to your next provider. Patient Signature:  ____________________________________________________________ Date:  ____________________________________________________________  
  
[facility] Provider Signature:  ____________________________________________________________ Date:  ____________________________________________________________

## 2017-02-20 NOTE — ANESTHESIA PROCEDURE NOTES
Spinal Block    Start time: 2/20/2017 11:04 AM  End time: 2/20/2017 11:11 AM  Performed by: LAKISHA Olsen  Authorized by: LAKISHA Olsen     Pre-procedure:   Indications: primary anesthetic  Preanesthetic Checklist: risks and benefits discussed and timeout performed    Timeout Time: 11:04          Spinal Block:   Patient Position:  Seated  Prep Region:  Lumbar  Prep: Betadine      Location:  L3-4  Technique:  Single shot        Needle:   Needle Type:  Pencil-tip  Needle Gauge:  25 G  Attempts:  1      Events: CSF confirmed, no blood with aspiration and no paresthesia        Assessment:  Insertion:  Uncomplicated  Patient tolerance:  Patient tolerated the procedure well with no immediate complications

## 2017-02-20 NOTE — ANESTHESIA PREPROCEDURE EVALUATION
Anesthetic History   No history of anesthetic complications            Review of Systems / Medical History  Patient summary reviewed, nursing notes reviewed and pertinent labs reviewed    Pulmonary  Within defined limits      Sleep apnea           Neuro/Psych   Within defined limits           Cardiovascular  Within defined limits                     GI/Hepatic/Renal  Within defined limits              Endo/Other  Within defined limits      Obesity and arthritis     Other Findings              Physical Exam    Airway  Mallampati: II  TM Distance: > 6 cm  Neck ROM: normal range of motion   Mouth opening: Normal     Cardiovascular  Regular rate and rhythm,  S1 and S2 normal,  no murmur, click, rub, or gallop             Dental  No notable dental hx       Pulmonary  Breath sounds clear to auscultation               Abdominal  GI exam deferred       Other Findings            Anesthetic Plan    ASA: 3  Anesthesia type: spinal          Induction: Intravenous  Anesthetic plan and risks discussed with: Patient

## 2017-02-20 NOTE — ROUTINE PROCESS
Patient: Henna Cabrera MRN: [id number]  SSN: [id number]   YOB: 1965  Age: 45 y.o. Sex: male     Patient is status post Procedure(s):  RIGHT TOTAL HIP REVISION ARTHROPLASTY POSTERIOR APPROACH. Surgeon(s) and Role:     * Carlos Romero MD - Primary    Local/Dose/Irrigation: Right hip injection: 0.5% Marcaine w/ epinephrine 1:200,000 30 ml, Exparel 266 mg in 15 ml injectable saline          PICC Single Lumen 08/00/02 Right;Basilic (Active)          Peripheral IV 02/20/17 Left Hand (Active)   Site Assessment Clean, dry, & intact 2/20/2017  9:01 AM   Phlebitis Assessment 0 2/20/2017  9:01 AM   Infiltration Assessment 0 2/20/2017  9:01 AM   Dressing Status Clean, dry, & intact; New 2/20/2017  9:01 AM   Dressing Type Tape;Transparent 2/20/2017  9:01 AM   Hub Color/Line Status Green 2/20/2017  9:01 AM                           Dressing/Packing:  Wound Hip Right-DRESSING TYPE: Silver products; Topical skin adhesive/glue (02/20/17 1300)  Splint/Cast:  ]    Other: TXA 2 gm w/ epinephrine 0.4 mg

## 2017-02-20 NOTE — H&P
Date of Surgery Update:  Azael Guerra was seen and examined. History and physical has been reviewed. The patient has been examined. There have been no significant clinical changes since the completion of the originally dated History and Physical.  Patient identified by surgeon; surgical site was confirmed by patient and surgeon.     Signed By: Judy Mena MD     February 20, 2017 10:00 AM

## 2017-02-20 NOTE — PERIOP NOTES
SBAR REPORT WAS CALLED TO ADITHYA GUTIERREZ ON [address]. WIFE WENT UP TO UNIT EARLIER. SHE HAS HIS CANE AND CLOTHES. HE IS GOING UP WITH HIS GLASSES. PATIENT WENT UP WITH BENJAMIN HOWARD IN.

## 2017-02-20 NOTE — PROGRESS NOTES
TRANSFER - IN REPORT:    Verbal report received from Dimple Strange (name) on Joel Hawley  being received from PACU (unit) for routine progression of care      Report consisted of patients Situation, Background, Assessment and   Recommendations(SBAR). Information from the following report(s) SBAR and Kardex was reviewed with the receiving nurse. Opportunity for questions and clarification was provided. Assessment completed upon patients arrival to unit and care assumed.

## 2017-02-20 NOTE — IP AVS SNAPSHOT
Current Discharge Medication List  
  
Take these medications at their scheduled times Dose & Instructions Dispensing Information Comments Morning Noon Evening Bedtime  
 aspirin delayed-release 325 mg tablet Your next dose is: Today, Tomorrow Other:  ____________ Dose:  325 mg Take 1 Tab by mouth two (2) times a day. Quantity:  60 Tab Refills:  0  
     
   
   
   
  
 diclofenac EC 75 mg EC tablet Commonly known as:  VOLTAREN Your next dose is: Today, Tomorrow Other:  ____________ Dose:  75 mg Take 1 Tab by mouth two (2) times a day. Quantity:  60 Tab Refills:  0 Take these medications as needed Dose & Instructions Dispensing Information Comments Morning Noon Evening Bedtime  
 oxyCODONE IR 5 mg immediate release tablet Commonly known as:  Suze Cantrell Your next dose is: Today, Tomorrow Other:  ____________ Dose:  5-10 mg Take 1-2 Tabs by mouth every four (4) hours as needed. Max Daily Amount: 60 mg.  
 Quantity:  80 Tab Refills:  0 Take these medications as directed Dose & Instructions Dispensing Information Comments Morning Noon Evening Bedtime OTHER Your next dose is: Today, Tomorrow Other:  ____________ JUICE AND VEGETABLE PLUS 4 CAPS DAILY( HERBAL MED) Refills:  0 Where to Get Your Medications Information about where to get these medications is not yet available ! Ask your nurse or doctor about these medications  
  aspirin delayed-release 325 mg tablet  
 diclofenac EC 75 mg EC tablet  
 oxyCODONE IR 5 mg immediate release tablet

## 2017-02-20 NOTE — ANESTHESIA POSTPROCEDURE EVALUATION
Post-Anesthesia Evaluation and Assessment    Patient: Colleen Soto MRN: [id number]  SSN: [id number]    YOB: 1965  Age: 45 y.o. Sex: male       Cardiovascular Function/Vital Signs  Visit Vitals    /63    Pulse 64    Temp 36.4 °C (97.5 °F)    Resp 19    Ht 5' 7\" (1.702 m)    Wt 119.9 kg (264 lb 4 oz)    SpO2 95%    BMI 41.39 kg/m2       Patient is status post spinal anesthesia for Procedure(s):  RIGHT TOTAL HIP REVISION ARTHROPLASTY POSTERIOR APPROACH. Nausea/Vomiting: None    Postoperative hydration reviewed and adequate. Pain:  Pain Scale 1: Numeric (0 - 10) (02/20/17 1410)  Pain Intensity 1: 0 (02/20/17 1410)   Managed    Neurological Status:   Neuro (WDL): Exceptions to WDL (some numbness L3 and below) (02/20/17 1410)  Neuro  Neurologic State: Alert; Appropriate for age (02/20/17 1410)  Orientation Level: Oriented X4 (02/20/17 1410)  Cognition: Follows commands (02/20/17 1410)  Speech: Slurred (02/20/17 1410)  LUE Motor Response: Purposeful (02/20/17 1410)  LLE Motor Response: Purposeful (02/20/17 1410)  RUE Motor Response: Purposeful (02/20/17 1410)  RLE Motor Response: Purposeful (02/20/17 1410)   At baseline    Mental Status and Level of Consciousness: Arousable    Pulmonary Status:   O2 Device: Nasal cannula (02/20/17 1410)   Adequate oxygenation and airway patent    Complications related to anesthesia: None    Post-anesthesia assessment completed.  No concerns    Signed By: Karmen Yan MD     February 20, 2017

## 2017-02-21 ENCOUNTER — HOME HEALTH ADMISSION (OUTPATIENT)
Dept: HOME HEALTH SERVICES | Facility: HOME HEALTH | Age: 52
End: 2017-02-21
Payer: COMMERCIAL

## 2017-02-21 VITALS
BODY MASS INDEX: 41.47 KG/M2 | OXYGEN SATURATION: 98 % | SYSTOLIC BLOOD PRESSURE: 109 MMHG | HEART RATE: 72 BPM | WEIGHT: 264.25 LBS | RESPIRATION RATE: 16 BRPM | HEIGHT: 67 IN | DIASTOLIC BLOOD PRESSURE: 72 MMHG | TEMPERATURE: 98.1 F

## 2017-02-21 LAB
ANION GAP BLD CALC-SCNC: 10 MMOL/L (ref 5–15)
BUN SERPL-MCNC: 10 MG/DL (ref 6–20)
BUN/CREAT SERPL: 14 (ref 12–20)
CALCIUM SERPL-MCNC: 8.4 MG/DL (ref 8.5–10.1)
CHLORIDE SERPL-SCNC: 106 MMOL/L (ref 97–108)
CO2 SERPL-SCNC: 22 MMOL/L (ref 21–32)
CREAT SERPL-MCNC: 0.74 MG/DL (ref 0.7–1.3)
GLUCOSE SERPL-MCNC: 131 MG/DL (ref 65–100)
HGB BLD-MCNC: 10 G/DL (ref 12.1–17)
POTASSIUM SERPL-SCNC: 4 MMOL/L (ref 3.5–5.1)
SODIUM SERPL-SCNC: 138 MMOL/L (ref 136–145)

## 2017-02-21 PROCEDURE — 97165 OT EVAL LOW COMPLEX 30 MIN: CPT

## 2017-02-21 PROCEDURE — 36415 COLL VENOUS BLD VENIPUNCTURE: CPT | Performed by: ORTHOPAEDIC SURGERY

## 2017-02-21 PROCEDURE — 97535 SELF CARE MNGMENT TRAINING: CPT

## 2017-02-21 PROCEDURE — G8989 SELF CARE D/C STATUS: HCPCS

## 2017-02-21 PROCEDURE — 74011250637 HC RX REV CODE- 250/637: Performed by: ORTHOPAEDIC SURGERY

## 2017-02-21 PROCEDURE — 97116 GAIT TRAINING THERAPY: CPT

## 2017-02-21 PROCEDURE — 85018 HEMOGLOBIN: CPT | Performed by: ORTHOPAEDIC SURGERY

## 2017-02-21 PROCEDURE — G8988 SELF CARE GOAL STATUS: HCPCS

## 2017-02-21 PROCEDURE — 97161 PT EVAL LOW COMPLEX 20 MIN: CPT

## 2017-02-21 PROCEDURE — G8987 SELF CARE CURRENT STATUS: HCPCS

## 2017-02-21 PROCEDURE — 80048 BASIC METABOLIC PNL TOTAL CA: CPT | Performed by: ORTHOPAEDIC SURGERY

## 2017-02-21 PROCEDURE — 74011250636 HC RX REV CODE- 250/636: Performed by: ORTHOPAEDIC SURGERY

## 2017-02-21 RX ORDER — OXYCODONE HYDROCHLORIDE 5 MG/1
5-10 TABLET ORAL
Qty: 80 TAB | Refills: 0 | Status: SHIPPED | OUTPATIENT
Start: 2017-02-21 | End: 2020-05-04

## 2017-02-21 RX ORDER — DICLOFENAC SODIUM 75 MG/1
75 TABLET, DELAYED RELEASE ORAL 2 TIMES DAILY
Qty: 60 TAB | Refills: 0 | Status: SHIPPED | OUTPATIENT
Start: 2017-02-21 | End: 2020-05-04

## 2017-02-21 RX ORDER — ASPIRIN 325 MG
325 TABLET, DELAYED RELEASE (ENTERIC COATED) ORAL 2 TIMES DAILY
Qty: 60 TAB | Refills: 0 | Status: SHIPPED | OUTPATIENT
Start: 2017-02-21 | End: 2020-05-04

## 2017-02-21 RX ADMIN — ACETAMINOPHEN 650 MG: 325 TABLET, FILM COATED ORAL at 12:19

## 2017-02-21 RX ADMIN — ACETAMINOPHEN 650 MG: 325 TABLET, FILM COATED ORAL at 07:47

## 2017-02-21 RX ADMIN — OXYCODONE HYDROCHLORIDE 10 MG: 5 TABLET ORAL at 12:18

## 2017-02-21 RX ADMIN — Medication 10 ML: at 07:48

## 2017-02-21 RX ADMIN — ACETAMINOPHEN 650 MG: 325 TABLET, FILM COATED ORAL at 00:13

## 2017-02-21 RX ADMIN — OXYCODONE HYDROCHLORIDE 10 MG: 5 TABLET ORAL at 03:49

## 2017-02-21 RX ADMIN — OXYCODONE HYDROCHLORIDE 10 MG: 5 TABLET ORAL at 07:47

## 2017-02-21 RX ADMIN — KETOROLAC TROMETHAMINE 15 MG: 30 INJECTION, SOLUTION INTRAMUSCULAR at 07:49

## 2017-02-21 RX ADMIN — ASPIRIN 325 MG: 325 TABLET, DELAYED RELEASE ORAL at 08:48

## 2017-02-21 RX ADMIN — KETOROLAC TROMETHAMINE 15 MG: 30 INJECTION, SOLUTION INTRAMUSCULAR at 00:18

## 2017-02-21 RX ADMIN — CEFAZOLIN 2 G: 1 INJECTION, POWDER, FOR SOLUTION INTRAMUSCULAR; INTRAVENOUS; PARENTERAL at 03:28

## 2017-02-21 NOTE — PROGRESS NOTES
Occupational Therapy EVALUATION/discharge  Patient: Anushka Saunders (83 y.o. male)  Date: 2/21/2017  Primary Diagnosis: INFECTION RIGHT PROTHESTIC HIP JOINT  Infection of right prosthetic hip joint ([address]  Procedure(s) (LRB):  RIGHT TOTAL HIP REVISION ARTHROPLASTY POSTERIOR APPROACH (Right) 1 Day Post-Op   Precautions:   Fall, WBAT    ASSESSMENT:   Based on the objective data described below, the patient presents with decreased independence with self care and functional mobility following right THRevision. Pt progressing well with mobility and at mod I level using walker in room. Pt was able to complete tub/transfer simulation using walker for safety and did very good with tasks. Pt mod I with AE. Pt has support from family at home as needed. Pt independent with all training and education. Pt with no further need for OT intervention. Recommend discharge home with family support and assistance. Further skilled acute occupational therapy is not indicated at this time. Discharge Recommendations: None  Further Equipment Recommendations for Discharge: none      SUBJECTIVE:   Patient stated I am feeling ready to go home.     OBJECTIVE DATA SUMMARY:   HISTORY:   Past Medical History   Diagnosis Date    Arthritis      HIPS    Chronic pain     Sleep apnea      IN THE PAST. CURRENTLY NO C-PAP     Past Surgical History   Procedure Laterality Date    Hx hip replacement Left 3/2016     DR. Villatoro ([place])    Hx vasectomy  2000    Hx lumbar laminectomy  2010    Hx lumbar fusion  09/18/2016    Pr total hip arthroplasty Left 03/02/2016     L HIP REPLACEMENT    Pr total hip arthroplasty Right 04/20/2016     TOTAL HIP    Hx orthopaedic Right 11/21/2016     REMOVAL OF ARTIFICIAL HIP AND TEMPORARY PUT IN       Prior Level of Function/Home Situation: pt was independent at home prior to surgery. He has had 5 surgeries in the past year since March 2016 with original hip replacement.    Expanded or extensive additional review of patient history:     Home Situation  Home Environment: Private residence  # Steps to Enter: 4  Rails to Enter: Yes  Hand Rails : Bilateral  One/Two Story Residence: One story  Living Alone: No  Support Systems: Spouse/Significant Other/Partner  Patient Expects to be Discharged to[de-identified] Private residence  Current DME Used/Available at Home: Cane, straight  Tub or Shower Type: Tub/Shower combination  [x]  Right hand dominant   []  Left hand dominant    EXAMINATION OF PERFORMANCE DEFICITS:  Cognitive/Behavioral Status:  Neurologic State: Alert  Orientation Level: Oriented X4  Cognition: Appropriate for age attention/concentration  Perception: Appears intact  Perseveration: No perseveration noted  Safety/Judgement: Good awareness of safety precautions  Skin: see nursing notes  Edema: none noted  Vision/Perceptual:                           Acuity: Within Defined Limits       Range of Motion:    AROM: Within functional limits  PROM: Within functional limits                    Strength:    Strength: Within functional limits              Coordination:  Coordination: Within functional limits  Fine Motor Skills-Upper: Right Intact; Left Intact    Gross Motor Skills-Upper: Right Intact; Left Intact  Tone & Sensation:    Tone: Normal  Sensation: Intact                      Balance:  Sitting: Intact  Standing: Intact; With support    Functional Mobility and Transfers for ADLs:  Bed Mobility:  Supine to Sit: Modified independent  Sit to Supine: Modified independent  Scooting: Supervision    Transfers:  Sit to Stand: Modified independent  Stand to Sit: Modified independent  Bed to Chair: Modified independent  Toilet Transfer : Modified independent  Tub Transfer: Supervision (simulated tub transfer)    ADL Assessment:  Feeding: Supervision    Oral Facial Hygiene/Grooming: Supervision    Bathing: Supervision    Upper Body Dressing: Supervision    Lower Body Dressing: Supervision    Toileting: Supervision ADL Intervention and task modifications:     IADL training:   Discussed at length precautions with IADL tasks. Discussed body alignment and ensuring pt does not twist hips/knees to ensure proper body alignment. Discussed finger tip rule for daily activities and to use a reacher for all tasks that are out of reach. Pt discussed to avoid tasks such as sweeping, mopping, vacuuming, changing bed linens, carrying a laundry basket, reaching into a low oven, or cleaning showers and toilets. Pt verbalized understanding of instructions. Did encourage pt to stand at sink for grooming, washing dishes, and light meal preparations to increase overall standing tolerance and independence with all activities. Tub/Shower transfers:   Discussed technique for transferring into a tub/shower combos . Pt educated to step in with strong leg and to come out with operated leg to ensure safety with task. Pt instructed to have a family member or friend to assist pt when they attempt to get in the shower for the first time. Pt educated they can use the wall for steady balance as needed. Discussed technique to bring operated leg into the shower and provided demonstration for task. Pt reporting understanding of training. Cognitive Retraining  Safety/Judgement: Good awareness of safety precautions    . Functional Measure:  Barthel Index:    Bathin  Bladder: 10  Bowels: 10  Groomin  Dressin  Feeding: 10  Mobility: 15  Stairs: 10  Toilet Use: 10  Transfer (Bed to Chair and Back): 15  Total: 90       Barthel and G-code impairment scale:  Percentage of impairment CH  0% CI  1-19% CJ  20-39% CK  40-59% CL  60-79% CM  80-99% CN  100%   Barthel Score 0-100 100 99-80 79-60 59-40 20-39 1-19   0   Barthel Score 0-20 20 17-19 13-16 9-12 5-8 1-4 0      The Barthel ADL Index: Guidelines  1. The index should be used as a record of what a patient does, not as a record of what a patient could do.   2. The main aim is to establish degree of independence from any help, physical or verbal, however minor and for whatever reason. 3. The need for supervision renders the patient not independent. 4. A patient's performance should be established using the best available evidence. Asking the patient, friends/relatives and nurses are the usual sources, but direct observation and common sense are also important. However direct testing is not needed. 5. Usually the patient's performance over the preceding 24-48 hours is important, but occasionally longer periods will be relevant. 6. Middle categories imply that the patient supplies over 50 per cent of the effort. 7. Use of aids to be independent is allowed. Mike Lara., Barthel, D.W. (8528). Functional evaluation: the Barthel Index. [facility] (14)2. GISELE Greenfield, Jasmyn Daily., Louloumoncho Monterroso., Bonna Meckel, [facility] (1999). Measuring the change indisability after inpatient rehabilitation; comparison of the responsiveness of the Barthel Index and Functional Colbert Measure. Journal of Neurology, Neurosurgery, and Psychiatry, 66(4), 809-891. Dayana Miller, N.J.A, DEJA Moraes, & Babita Johnson, M.DERIC. (2004.) Assessment of post-stroke quality of life in cost-effectiveness studies: The usefulness of the Barthel Index and the EuroQoL-5D. Quality of Life Research, 13, 769-50     G codes: In compliance with CMSs Claims Based Outcome Reporting, the following G-code set was chosen for this patient based on their primary functional limitation being treated: The outcome measure chosen to determine the severity of the functional limitation was the Barthel Index with a score of 90/100 which was correlated with the impairment scale. ?  Self Care:     - CURRENT STATUS: CI - 1%-19% impaired, limited or restricted    - GOAL STATUS: CI - 1%-19% impaired, limited or restricted    - D/C STATUS:  CI - 1%-19% impaired, limited or restricted     Occupational Therapy Evaluation Charge Determination   History Examination Decision-Making   LOW Complexity : Brief history review  LOW Complexity : 1-3 performance deficits relating to physical, cognitive , or psychosocial skils that result in activity limitations and / or participation restrictions  LOW Complexity : No comorbidities that affect functional and no verbal or physical assistance needed to complete eval tasks       Based on the above components, the patient evaluation is determined to be of the following complexity level: LOW   Pain:  Pain Scale 1: Numeric (0 - 10)  Pain Intensity 1: 6  Pain Location 1: Hip  Pain Orientation 1: Right  Pain Description 1: Aching  Pain Intervention(s) 1: Medication (see MAR)  Activity Tolerance:   VSS throughout session. After treatment:   [x]  Patient left in no apparent distress sitting up in chair  []  Patient left in no apparent distress in bed  [x]  Call bell left within reach  [x]  Nursing notified  []  Caregiver present  []  Bed alarm activated    COMMUNICATION/EDUCATION:   Communication/Collaboration:  [x]      Home safety education was provided and the patient/caregiver indicated understanding. [x]      Patient/family have participated as able and agree with findings and recommendations. []      Patient is unable to participate in plan of care at this time.   Findings and recommendations were discussed with: Physical Therapist and Registered Nurse    Ruben Manuel OT  Time Calculation: 8 mins

## 2017-02-21 NOTE — PROGRESS NOTES
Primary Nurse Pedro Walker, MATT and [address] [place] [address], RN performed a dual skin assessment on this patient No impairment noted  Cesar score is 22

## 2017-02-21 NOTE — PROGRESS NOTES
Orders received, chart reviewed and patient evaluated by physical therapy. Recommend patient to discharge to home with HHPT. He is cleared from a PT standpoint and does not require additional session. Patient is cleared for discharge from PT standpoint:  YES [x]     NO []     Recommend with nursing patient to complete as able in order to maintain strength, endurance and independence: OOB to chair 3x/day with supervision and ambulating with supervision. Thank you for your assistance. Full evaluation to follow.    Whit Martinez, PT

## 2017-02-21 NOTE — PROGRESS NOTES
No new complaints overnight. Denies chest pain or shortness of breath. Tolerating diet well      GEN:  NAD.  AOx3   ABD:  S/NT/ND   RLE:  Dressing C/D/I , 5/5 motor, Calf nttp (Bilat), Sensation rossly intact to light touch throughout, 1+ dp/pt pulses, foot perfused    Patient Vitals for the past 24 hrs:   Temp Pulse Resp BP SpO2   02/21/17 0846 98.1 °F (36.7 °C) 72 16 109/72 98 %   02/21/17 0347 98.1 °F (36.7 °C) 64 16 116/71 97 %   02/21/17 0011 98.1 °F (36.7 °C) 73 16 122/80 98 %   02/20/17 2124 98.5 °F (36.9 °C) 82 16 125/73 95 %   02/20/17 2014 98 °F (36.7 °C) 86 16 139/88 97 %   02/20/17 1854 98.1 °F (36.7 °C) 84 16 119/71 95 %   02/20/17 1734 97.6 °F (36.4 °C) 82 16 115/78 98 %   02/20/17 1700 - 60 17 129/83 94 %   02/20/17 1630 - 64 19 124/66 97 %   02/20/17 1600 - (!) 52 (!) 6 111/68 94 %   02/20/17 1545 - 61 17 147/84 95 %   02/20/17 1530 - 61 18 124/64 93 %   02/20/17 1515 - (!) 55 16 142/83 95 %   02/20/17 1500 - (!) 55 16 112/66 98 %   02/20/17 1447 97.4 °F (36.3 °C) - - - -   02/20/17 1445 - 67 15 104/85 96 %   02/20/17 1430 - 63 15 106/62 97 %   02/20/17 1415 - 64 19 120/63 95 %   02/20/17 1410 97.5 °F (36.4 °C) 77 18 109/68 96 %   02/20/17 1406 - 68 14 - 94 %   02/20/17 1405 97.5 °F (36.4 °C) 85 13 110/66 94 %   02/20/17 1404 - - - 105/60 -       Current Facility-Administered Medications   Medication Dose Route Frequency    0.9% sodium chloride infusion  125 mL/hr IntraVENous CONTINUOUS    sodium chloride (NS) flush 5-10 mL  5-10 mL IntraVENous Q8H    sodium chloride (NS) flush 5-10 mL  5-10 mL IntraVENous PRN    acetaminophen (TYLENOL) tablet 650 mg  650 mg Oral Q6H    celecoxib (CELEBREX) capsule 200 mg  200 mg Oral BID    HYDROmorphone (PF) (DILAUDID) injection 0.5 mg  0.5 mg IntraVENous Q4H PRN    naloxone (NARCAN) injection 0.4 mg  0.4 mg IntraVENous PRN    dexamethasone (DECADRON) 4 mg/mL injection 10 mg  10 mg IntraVENous ONCE    ondansetron (ZOFRAN) injection 4 mg  4 mg IntraVENous Q4H PRN    prochlorperazine (COMPAZINE) with saline injection 5 mg  5 mg IntraVENous Q6H PRN    hydrOXYzine HCl (ATARAX) tablet 10 mg  10 mg Oral Q8H PRN    famotidine (PEPCID) tablet 20 mg  20 mg Oral BID    senna-docusate (PERICOLACE) 8.6-50 mg per tablet 1 Tab  1 Tab Oral BID    polyethylene glycol (MIRALAX) packet 17 g  17 g Oral DAILY    [START ON 2/22/2017] bisacodyl (DULCOLAX) suppository 10 mg  10 mg Rectal DAILY PRN    aspirin delayed-release tablet 325 mg  325 mg Oral BID    ketorolac (TORADOL) injection 15 mg  15 mg IntraVENous Q6H    oxyCODONE IR (ROXICODONE) tablet 5-10 mg  5-10 mg Oral Q4H PRN       Lab Results   Component Value Date/Time    HGB 10.0 02/21/2017 03:42 AM    INR 1.0 02/13/2017 04:27 PM       Lab Results   Component Value Date/Time    Sodium 138 02/21/2017 03:42 AM    Potassium 4.0 02/21/2017 03:42 AM    Chloride 106 02/21/2017 03:42 AM    CO2 22 02/21/2017 03:42 AM    BUN 10 02/21/2017 03:42 AM    Creatinine 0.74 02/21/2017 03:42 AM    Calcium 8.4 02/21/2017 03:42 AM            45 y.o. male s/p right hip revision arthroplasty on 2/20/2017  . Doing well.      Precautions: Posterior  ABX: Complete 24 hours perioperative abx  PATHWAY: D/C Ajay per protocol on POD 1  DVT Prophylaxis:  mg BID  Weight Bearing: WBAT RLE   Pain Control: Celebrex, toradol (if Cr normal), Tylenol, scheduled tramadol, oxy 5 mg for breakthrough, dilaudid IV 0.5 mg for secondary breakthrough  Disposition: Home, PT

## 2017-02-21 NOTE — PROGRESS NOTES
Bedside shift change report given to Viviana (oncoming nurse) by Fatmata Manzano (offgoing nurse). Report included the following information SBAR and Kardex.

## 2017-02-21 NOTE — DISCHARGE INSTRUCTIONS
After Hospital Care Plan:  Discharge Instructions Posterior Approach   Hip Replacement-Dr. Dominique GODDARD [facility]    Patient Name: Roddy Louis  Date of procedure:2/20/2017  Procedure:Procedure(s):  RIGHT TOTAL HIP REVISION ARTHROPLASTY POSTERIOR APPROACH  Surgeon: Surgeon(s) and Role:     * Collette Fillers, MD - Primary     PCP: Bernarda Alvarado MD  Date of discharge: No discharge date for patient encounter. Follow up appointments  -follow up with Dr. Dominique Guy in 4 weeks. Call [phone] to make an appointment. [facility]: _______________________   phone: _____________________  The agency will contact you to arrange dates/times for visits. Please call them if you do not hear from them within 24 hours after you are discharged      When to call your Orthopaedic Surgeon:  -Signs of hip dislocation-increased hip pain, unrelieved pain or if you have difficulty or are unable to walk  -Signs of infection-if your incision is red; continues to have drainage; drainage has a foul odor or if you have a persistent fever over 101 degrees  -Signs of a blood clot in your leg-calf pain, tenderness, redness, swelling of lower leg    When to call your Primary Care Physician:  -Concerns about medical conditions such as diabetes, high blood pressure, asthma, congestive heart failure  -Call if blood sugars are elevated, persistent headache or dizziness, coughing or congestion, constipation or diarrhea, burning with urination, abnormal heart rate    When to call 911 and go to the nearest emergency room  -acute onset of chest pain, shortness of breath, difficulty breathing    Activity  - weight bearing as tolerated with walker or crutches.  Refer to pages 26-36 of your handbook for instructions and pictures  -20 repetitions of each exercise 3 times each day as instructed by the physical therapist.  Refer to pages 38-45 of our handbook for instructions and pictures  -get up every one hour and walk (except at night when sleeping)  - Maintain hip dislocation precautions for 6 weeks. Refer to pages 23-25 of your handbook for instructions and pictures   -do not cross your legs   -do not roll your surgery leg inward   -do not bend more than 90 degrees  -Do not drive or operate heavy machinery          Incision Care  -the Aquacel (brown, waterproof) surgical dressing is to remain on your hip for 7 days. On the 7th day have someone gently peel the dressing off by carefully lifting the edge and stretching it slightly to break the adhesive seal and leave incision open to air. You may take a shower with the Aquacel dressing in place. Preventing blood clots   -Take Aspirin 325 mg twice daily as prescribed  by Dr. Audrey Joe for one month following surgery      Pain management  - Please take scheduled 650 mg tylenol every 6 hours for 2 weeks  - Please take scheduled celebrex 200 mg twice daily for 2 weeks  - Please take tramadol every 6 hours for pain as needed for pain. - For breakthrough please take 5-10 mg oxycodone     - Avoid alcoholic beverages while taking pain medication  - Do not take any over-the-counter medication for pain except Tylenol (acetaminophen)  - Please be aware that many medications contain Tylenol. We do not want you to over medicate so please read the information below as a guide. Do not take more than 4 Grams of Tylenol in a 24 hour  - You may place an ice bag on your knee for 15-20 minutes after exercising and as needed throughout the day and night      Diet  -resume usual diet; drink plenty of fluids; eat foods high in fiber  -you may want to take a stool softener (such as Senokot-S or Colace) to prevent constipation while you are taking pain medication.   If constipation occurs, take a laxative (such as Dulcolax tablets, Milk of Magnesia, or a suppository)    [address] Protocol (to be followed by [address])    Nursing-per physicians order   -remove staples 10-14 days post op and apply steri-strips  -remove Aquacel dressing at 7 days post-op  -complete head to toe assessment, vital signs  -medication reconciliation  -review pain management  -manage chronic medical conditions    Physical Therapy-per physicians order    Weight bearing status:  Precautions at Admission: Fall, WBAT     Right Side Weight Bearing: As tolerated    Mobility Status:     Sit to Stand: Modified independent          Gait:  Distance (ft): 330 Feet (ft)  Ambulation - Level of Assistance: Supervision  Assistive Device: Walker, rolling  Gait Abnormalities: Antalgic    ADL status overall composite:                 Physical Therapy  -assessment and evaluation-bed mobility; functional transfers (bed, chair, bathroom, stairs); ambulation with equipment, car transfers, safety and ability to get out of house in the event of an emergency  -review and maintain routine hip precautions x 6 weeks;  weight bearing as tolerated  -discuss pain management  -review how to do ADLs.   Refer to pages 46-50 of handbook    -Home Exercise Program-refer to pages 38-45 of handbook  -supine exercises-quad sets, hamstring sets, gluteal sets, hip abduction/adduction slides, hip external rotation, heel slides (flexing the knee)  -sitting exercises-ankle pumps, bicep curls (use weights as appropriate), triceps pushups, shoulder flexion (use weights as appropriate)  -standing exercises holding onto supportive counter-toe raises, heel raises, mini-squats, heel/toe touches with knee bend, marching in place, hip abduction/adduction, hip external rotation, hip extension, hip abduction/extension/external rotation (concentration on gluteus esther), heel toe taps      Physical therapy goals by discharge from [address] ambulation with walker, crutches or cane if needed (level surfaces and   stairs)  -Independent with knowledge and adherence to hip precautions  -Daily performance of home exercise program  -Independent with stair climbing and car transfers  -Progress to community ambulator (least restrictive assistive device)

## 2017-02-21 NOTE — PROGRESS NOTES
Problem: Mobility Impaired (Adult and Pediatric)  Goal: *Acute Goals and Plan of Care (Insert Text)  Physical Therapy Goals  Initiated 2/21/2017    1. Patient will move from supine to sit and sit to supine in bed with modified independence within 4 days. 2. Patient will perform sit to stand with modified independence within 4 days. 3. Patient will ambulate with modified independence for 250 feet with the least restrictive device within 4 days. 4. Patient will ascend/descend 4 stairs with bilateral handrail(s) with supervision/set-up within 4 days. 5. Patient will perform post op protocol home exercise program per protocol with independence within 4 days. PHYSICAL THERAPY EVALUATION/DISCHARGE  Patient: Philip Cedillo (85 y.o. male)  Date: 2/21/2017  Primary Diagnosis: INFECTION RIGHT PROTHESTIC HIP JOINT  Infection of right prosthetic hip joint ([address]  Procedure(s) (LRB):  RIGHT TOTAL HIP REVISION ARTHROPLASTY POSTERIOR APPROACH (Right) 1 Day Post-Op   Precautions:   Fall, WBAT      ASSESSMENT :  Based on the objective data described below, the patient presents with decreased strength/ROM in R LE. In summary, pt was mod I/supervision for functional mobility. Pt ambulated 330 ft with a step through gait pattern with RW, demonstrating equal WB on LEs. He also attempted to ascend/descend 16 steps and demonstrated good safety awareness and proper foot leading. Pt anticipates discharge today and is cleared from PT because he and met all goals and demonstrated functional independence. Pt denied having a PM session and [facility] will f/u. Patient is cleared for discharge from PT standpoint:  YES [x]     NO []     Patient will benefit from skilled intervention to address the above impairments.   Patients rehabilitation potential is considered to be Excellent  Factors which may influence rehabilitation potential include:   [X]         None noted  [ ]         Mental ability/status  [ ]         Medical condition  [ ] Home/family situation and support systems  [ ]         Safety awareness  [ ]         Pain tolerance/management  [ ]         Other:        PLAN : Pt cleared for discharge    Frequency/Duration: Patient has met all goals. Skilled therapy no longer needed. Discharge Recommendations: Home Health  Further Equipment Recommendations for Discharge: NONE, pt has RW       SUBJECTIVE:   Patient stated Poli Soto can do this.       OBJECTIVE DATA SUMMARY:   HISTORY:    Past Medical History   Diagnosis Date    Arthritis         HIPS    Chronic pain      Sleep apnea         IN THE PAST. CURRENTLY NO C-PAP     Past Surgical History   Procedure Laterality Date    Hx hip replacement Left 3/2016       DR. Villatoro ([place])    Hx vasectomy   2000    Hx lumbar laminectomy   2010    Hx lumbar fusion   09/18/2016    Pr total hip arthroplasty Left 03/02/2016       L HIP REPLACEMENT    Pr total hip arthroplasty Right 04/20/2016       TOTAL HIP    Hx orthopaedic Right 11/21/2016       REMOVAL OF ARTIFICIAL HIP AND TEMPORARY PUT IN     Prior Level of Function/Home Situation: Independent  Personal factors and/or comorbidities impacting plan of care:      Home Situation  Home Environment: Private residence  # Steps to Enter: 4  Rails to Enter: Yes  Hand Rails : Bilateral  One/Two Story Residence: One story  Living Alone: No  Support Systems: Spouse/Significant Other/Partner  Patient Expects to be Discharged to[de-identified] Private residence  Current DME Used/Available at Home: Ban Savage, atn, Walker, rolling     EXAMINATION/PRESENTATION/DECISION MAKING:   Critical Behavior:  Neurologic State: Alert  Orientation Level: Oriented X4  Cognition: Appropriate for age attention/concentration  Safety/Judgement: Awareness of environment     Strength:    Strength: Generally decreased, functional               Range Of Motion:  AROM: Generally decreased, functional        Functional Mobility:  Bed Mobility:           Scooting: Supervision  Transfers:  Sit to Stand: Modified independent  Stand to Sit: Modified independent                       Balance:   Sitting: Intact  Standing: Intact; With support (RW)  Ambulation/Gait Training:  Distance (ft): 330 Feet (ft)  Assistive Device: Walker, rolling  Ambulation - Level of Assistance: Supervision        Gait Abnormalities: Antalgic  Right Side Weight Bearing: As tolerated     Base of Support: Widened     Speed/Zayra: Slow                 Stairs:  Number of Stairs Trained: 16 (16 ascending, 16 descending)  Stairs - Level of Assistance: Supervision              Rail Use: Both     Therapeutic Exercises:   Reviewed exercises per protocol. Pt stated all exercises. Pain:  Pain Scale 1: Numeric (0 - 10)  Pain Intensity 1: 2  Pain Location 1: Hip  Pain Orientation 1: Right  Pain Description 1: Aching      Pt provided CP post session. Activity Tolerance:   Pt tolerated activity. Please refer to the flowsheet for vital signs taken during this treatment. After treatment:   [X]         Patient left in no apparent distress sitting up in chair  [ ]         Patient left in no apparent distress in bed  [X]         Call bell left within reach  [X]         Nursing notified  [X]         Caregiver present  [ ]         Bed alarm activated      COMMUNICATION/EDUCATION:   The patients plan of care was discussed with: Registered Nurse.  [X]         Fall prevention education was provided and the patient/caregiver indicated understanding. [X]         Patient/family have participated as able in goal setting and plan of care. [X]         Patient/family agree to work toward stated goals and plan of care. [ ]         Patient understands intent and goals of therapy, but is neutral about his/her participation. [ ]         Patient is unable to participate in goal setting and plan of care.      Thank you for this referral.  Garry Keenan, SPT   Time Calculation: 16 mins

## 2017-02-21 NOTE — PROGRESS NOTES
Vlad Cabrera RN reviewed discharge instructions with the patient and spouse. The patient and spouse verbalized understanding. Copy of AVS and scripts sent with pt. No further questions at this time. IV removed, belongings returned.

## 2017-02-21 NOTE — PROGRESS NOTES
Care Management Interventions  PCP Verified by CM: Yes (Dr. Eden Cespedes)  Palliative Care Consult (Criteria: CHF and RRAT>21): No  Reason for No Palliative Care Consult: Other (see comment) (no order/no needs)  Mode of Transport at Discharge: Other (see comment) (car/wife)  Transition of Care Consult (CM Consult): [address]: Yes  MyChart Signup: No  Discharge Durable Medical Equipment: No  Health Maintenance Reviewed: Yes  Physical Therapy Consult: Yes  Occupational Therapy Consult: Yes  Speech Therapy Consult: No  Current Support Network: Lives with Spouse  Confirm Follow Up Transport: Family  Plan discussed with Pt/Family/Caregiver: Yes  Freedom of Choice Offered: Yes  Discharge Location  Discharge Placement: Home with home health    Home care orders noted. CRM met with the patient to offer agency choice. The patient would like to use [facility]. He had used them prior. CRM sent orders via [facility]. The patient will discharge home today.  JOSHUA

## 2017-02-21 NOTE — PROGRESS NOTES
Pt states pain \"hasn't really changed\" since receiving 5mg oxycodone. Pt awake and alert, vitals stable. Pt declines IV dilaudid, states he took 10mg of oxycodone for pain after previous surgery. Dr. Lewis Cox, on call for Dr. Lorne Templeton, updated and order changed to oxycodone 5-10 mg q 4 hrs as needed.

## 2017-02-22 ENCOUNTER — HOME CARE VISIT (OUTPATIENT)
Dept: SCHEDULING | Facility: HOME HEALTH | Age: 52
End: 2017-02-22
Payer: COMMERCIAL

## 2017-02-22 VITALS
RESPIRATION RATE: 18 BRPM | HEART RATE: 75 BPM | WEIGHT: 279.4 LBS | DIASTOLIC BLOOD PRESSURE: 82 MMHG | TEMPERATURE: 97.6 F | OXYGEN SATURATION: 98 % | HEIGHT: 67 IN | BODY MASS INDEX: 43.85 KG/M2 | SYSTOLIC BLOOD PRESSURE: 122 MMHG

## 2017-02-22 PROCEDURE — G0151 HHCP-SERV OF PT,EA 15 MIN: HCPCS

## 2017-02-24 ENCOUNTER — HOME CARE VISIT (OUTPATIENT)
Dept: SCHEDULING | Facility: HOME HEALTH | Age: 52
End: 2017-02-24
Payer: COMMERCIAL

## 2017-02-24 PROCEDURE — G0151 HHCP-SERV OF PT,EA 15 MIN: HCPCS

## 2017-02-25 VITALS
RESPIRATION RATE: 18 BRPM | DIASTOLIC BLOOD PRESSURE: 80 MMHG | HEART RATE: 82 BPM | SYSTOLIC BLOOD PRESSURE: 122 MMHG | TEMPERATURE: 97.2 F | OXYGEN SATURATION: 97 %

## 2017-02-27 ENCOUNTER — HOME CARE VISIT (OUTPATIENT)
Dept: HOME HEALTH SERVICES | Facility: HOME HEALTH | Age: 52
End: 2017-02-27
Payer: COMMERCIAL

## 2017-02-27 ENCOUNTER — HOME CARE VISIT (OUTPATIENT)
Dept: SCHEDULING | Facility: HOME HEALTH | Age: 52
End: 2017-02-27
Payer: COMMERCIAL

## 2017-02-27 VITALS
HEART RATE: 81 BPM | SYSTOLIC BLOOD PRESSURE: 150 MMHG | OXYGEN SATURATION: 98 % | RESPIRATION RATE: 18 BRPM | TEMPERATURE: 99.4 F | DIASTOLIC BLOOD PRESSURE: 70 MMHG

## 2017-02-27 PROCEDURE — G0151 HHCP-SERV OF PT,EA 15 MIN: HCPCS

## 2017-03-01 NOTE — DISCHARGE SUMMARY
Discharge Summary     Patient ID:  Merced Villasenor  [id number]  72 y.o.  1965    Admit Date: 2/20/2017    Discharge Date: 2/28/2017    Admission Diagnoses: INFECTION RIGHT PROTHESTIC HIP JOINT  Infection of right prosthetic hip joint [facility])    Discharge Diagnoses: Active Problems:    Infection of right prosthetic hip joint ([address] (2/20/2017)         Admission Condition: Good    Discharge Condition: Good    Last Procedure: Procedure(s):  RIGHT TOTAL HIP REVISION ARTHROPLASTY POSTERIOR APPROACH      Medications:   No current facility-administered medications for this encounter. Current Outpatient Prescriptions   Medication Sig    aspirin delayed-release 325 mg tablet Take 1 Tab by mouth two (2) times a day.  oxyCODONE IR (ROXICODONE) 5 mg immediate release tablet Take 1-2 Tabs by mouth every four (4) hours as needed. Max Daily Amount: 60 mg.    diclofenac EC (VOLTAREN) 75 mg EC tablet Take 1 Tab by mouth two (2) times a day.  OTHER JUICE AND VEGETABLE PLUS 4 CAPS DAILY( HERBAL MED)       Hospital Course: The patient was admitted to the hospital on the day pf surgery and underwent reimplantation right hip. They tolerated the procedure well. Pain was controlled post-operatively with a multimodal pain regiment including celebrex, tylenol,tramadol,  toradol (if normal creatinine) and oxycodone for breakthrough. The butt catheter was removed on POD#1. Physical therapy began to work with the patient on POD#0 and continued daily. 24 hours of perioperative antibiotics were completed. Aspirin was given for DVT prophylaxis beginning on POD#0. The patient progressed well and was discharged home in stable condition once they cleared therapy.      Consults: None    Significant Diagnostic Studies: post op xrays showed a stable Procedure(s):  RIGHT TOTAL HIP REVISION ARTHROPLASTY POSTERIOR APPROACH    Disposition: home    The patient was discharged with the following instructions:   1.) He will take aspirin for DVT prophylaxis, tylenol, celebrex, and oxycodone for breakthrough pain. 2.) Shower and wound instructions were given. 3.) Activity: Activity as tolerated  4.) Diet: Regular  5). Patient will continue to take PO antibiotics until follow up. Follow-up with Deacon Florence MD in 3 weeks after his discharge. Sooner if there is a problem. Cannot display discharge medications since this patient is not currently admitted.       Signed:  Deacon Florence MD  2/28/2017, 9:21 PM

## 2017-03-02 ENCOUNTER — HOME CARE VISIT (OUTPATIENT)
Dept: SCHEDULING | Facility: HOME HEALTH | Age: 52
End: 2017-03-02
Payer: COMMERCIAL

## 2017-03-02 VITALS
SYSTOLIC BLOOD PRESSURE: 128 MMHG | HEART RATE: 69 BPM | TEMPERATURE: 97.5 F | OXYGEN SATURATION: 98 % | DIASTOLIC BLOOD PRESSURE: 82 MMHG | RESPIRATION RATE: 18 BRPM

## 2017-03-02 PROCEDURE — G0151 HHCP-SERV OF PT,EA 15 MIN: HCPCS

## 2017-03-06 LAB
BACTERIA SPEC CULT: ABNORMAL
BACTERIA SPEC CULT: ABNORMAL
BACTERIA SPEC CULT: NORMAL
BACTERIA SPEC CULT: NORMAL
GRAM STN SPEC: ABNORMAL
GRAM STN SPEC: ABNORMAL
SERVICE CMNT-IMP: ABNORMAL
SERVICE CMNT-IMP: NORMAL

## 2017-03-07 ENCOUNTER — HOME CARE VISIT (OUTPATIENT)
Dept: SCHEDULING | Facility: HOME HEALTH | Age: 52
End: 2017-03-07
Payer: COMMERCIAL

## 2017-03-07 VITALS
OXYGEN SATURATION: 98 % | DIASTOLIC BLOOD PRESSURE: 100 MMHG | RESPIRATION RATE: 18 BRPM | SYSTOLIC BLOOD PRESSURE: 140 MMHG | TEMPERATURE: 97.9 F | HEART RATE: 80 BPM

## 2017-03-07 PROCEDURE — G0151 HHCP-SERV OF PT,EA 15 MIN: HCPCS

## 2017-03-07 NOTE — BRIEF OP NOTE
BRIEF OPERATIVE NOTE    Date of Procedure: 2/20/2017   Preoperative Diagnosis: INFECTION RIGHT PROTHESTIC HIP JOINT  Postoperative Diagnosis: INFECTION RIGHT PROTHESTIC HIP JOINT    Procedure(s):  RIGHT TOTAL HIP REVISION ARTHROPLASTY POSTERIOR APPROACH  Surgeon(s) and Role:     * Savanna Coyle MD - Primary            Surgical Staff:  Circ-1: Darius Steward RN  Circ-Relief: Niharika Ayers RN  Scrub Tech-1: Ena Houston  Scrub Tech-Relief: Nevaeh Villa  Retractor Olivo: Kota Batresmann  Surg Asst-1: Carmen Celaya  Surg Asst-Relief: Wyabiodun Sinclair  Event Time In   Incision Start 1126   Incision Close 3929     Anesthesia: Spinal   Estimated Blood Loss: 400  Specimens:   ID Type Source Tests Collected by Time Destination   1 : C+S right hip Joint Fluid Joint, Hip CULTURE, ANAEROBIC, CULTURE, BODY FLUID, GRAM Cortney Jean MD 2/20/2017 1140 Microbiology      Findings: Retained abx spacer right hip   Complications: None  Implants:   Implant Name Type Inv.  Item Serial No.  Lot No. LRB No. Used Action   FELIPE ACETABULAR SHELL58 MM SIZE F   [id number]  [id number] Right 1 Implanted   FELIPE FEMORAL STEM 155 MM LENGTH, 17 MM DISAL DIAM    [id number]  [id number] Right 1 Implanted   SCR BNE ACET CANC TRIDENT --  - SN/A  SCR BNE ACET CANC TRIDENT --  N/A [employer] HOW [id number] Right 2 Implanted   Trident X3 0 deg Polyethylene Insert   N/A [employer] HOW [id number] Right 1 Implanted   Restoration Modular Hip System, 25mm, +0, V40   N/A [employer] HOW [id number] Right 1 Implanted   HEAD FEM DELT V40 -2.5MM 36MM -- V40 BIOLOX - SN/A  HEAD FEM DELT V40 -2.5MM 36MM -- V40 BIOLOX N/A [employer] HOW [id number] Right 1 Implanted   TOTAL HIP REVISION           Right 1 Implanted

## 2017-03-07 NOTE — OP NOTES
[address] [facility] [address], [address]   OP NOTE       Name:  Ivy Manley   MR#:  [id number]   :  1965   Account #:  [de-identified]    Surgery Date:  2017   Date of Adm:  2017       PREOPERATIVE DIAGNOSIS: Retained antibiotic spacer, right hip. POSTOPERATIVE DIAGNOSIS: Retained antibiotic spacer, right hip. PROCEDURES PERFORMED: Revision right total hip arthroplasty. PRIMARY SURGEON: Vick Eric MD    ASSISTANTS: Maurice Ochoa, and Dr. Perla Cope    ANESTHESIA: Spinal    COMPLICATIONS: None. SPECIMENS REMOVED: Included fluid samples as well as tissue   cultures. DRAINS: None. ESTIMATED BLOOD LOSS: 400 mL. INDICATIONS FOR PROCEDURE: This is a 54-year-old gentleman   who had a previous right hip periprosthetic infection. He was treated   with a Prostalac antibiotic spacer as well as 6 weeks of intravenous   antibiotics. He was followed by Infectious Disease and his   inflammatory markers came down appropriately after discontinuation of   antibiotics. Risks and benefits were discussed of removal of the   antibiotic spacer and revision right total hip arthroplasty. He signed a   consent and wished to proceed. DESCRIPTION OF PROCEDURE: The patient was identified in the   preoperative holding. The correct site was marked and confirmed. He   was taken to the operating room and spinal anesthesia was induced. A   Moss catheter was placed. He was placed in the lateral decubitus   position and an axillary roll was placed. All bony prominences were   well-padded. He was prepped and draped in standard sterile fashion. A   preoperative time-out was held and the correct site was again   confirmed. We used his previous posterolateral incision and carried our dissection   down to skin and subcutaneous tissue. The IT band was identified and   incised. A Charnley retractor was placed.  We took down the scarred   external rotators as well as portion of the gluteus esther for   exposure purposes. There was no significant fluid encountered. The   hip was dislocated. Scar tissue was removed. The cement mantle of   the femur was disrupted with an osteotome and the femur was   removed with relative ease secondary to first generation cement   technique. We turned our attention to the acetabulum. We used a   combination of osteotomes, both flexible and rigid, to disrupt this   interface and removed the cemented polyethylene with no trauma to   the acetabulum. We thoroughly irrigated with normal saline and used a   Hughes elevator to scrape soft tissue. We began reaming and reamed up   to a size 58 acetabular shell. This provided good fit. We then impacted   this into place and placed 2 screws followed by a 36 mm liner. We then turned our attention to the femur. We began reaming and   reamed to a 17 mm with appropriate chatter. We took an intraoperative   x-ray to confirm this. We then placed 155 x 17 mm stem. We then   began trialing off of this with appropriate anteversion. We chose a +0.5   mm proximal body. We trialed with a 0 and bone was noted to be   slightly long. We trialed with a negative 2.5 mm, 36 head ball and were   very happy with our stability and the leg lengths. We confirmed leg   lengths with intraoperative x-ray. We were happy with the implant. We   then dislocated the hip and removed the trials. We assembled our final   implant as mentioned above and reduced the hip. The posterior capsule was closed with Ethibond suture. We infiltrated   the soft tissues with Exparel and Marcaine throughout. We then closed   the IT band with #1 Vicryl, followed by Stratafix suture. We closed   subcutaneous tissue with 2-0 Vicryl, followed by 3-0 Monocryl,   Dermabond and an Aquacel. Anesthesia was reversed and the patient   was taken to the PACU in stable condition. All counts were correct x2.  I   was present for the entirety of the procedure.         Rosalie Muhammad MD CM / DARVIN   D:  03/07/2017   09:46   T:  03/07/2017   14:45   Job #:  [id number]

## 2017-03-09 ENCOUNTER — HOME CARE VISIT (OUTPATIENT)
Dept: SCHEDULING | Facility: HOME HEALTH | Age: 52
End: 2017-03-09
Payer: COMMERCIAL

## 2017-03-09 PROCEDURE — G0151 HHCP-SERV OF PT,EA 15 MIN: HCPCS

## 2017-03-10 VITALS
TEMPERATURE: 98.2 F | OXYGEN SATURATION: 98 % | HEART RATE: 67 BPM | DIASTOLIC BLOOD PRESSURE: 90 MMHG | RESPIRATION RATE: 18 BRPM | SYSTOLIC BLOOD PRESSURE: 148 MMHG

## 2020-05-04 RX ORDER — BUPROPION HYDROCHLORIDE 300 MG/1
300 TABLET ORAL
Status: ON HOLD | COMMUNITY
End: 2020-07-03

## 2020-05-04 RX ORDER — IBUPROFEN 200 MG
200 TABLET ORAL AS NEEDED
Status: ON HOLD | COMMUNITY
End: 2020-07-03 | Stop reason: SDUPTHER

## 2020-05-04 NOTE — PERIOP NOTES
Per patient, he will not be able to come to [facility] three days before surgery on 5/10 for Covid-19 swab; advised patient that this is required prior to surgery and must be done at our facility; patient states that his daughter will be getting  that weekend so he plans to call surgeon to reschedule surgery

## 2020-05-04 NOTE — PERIOP NOTES
[address], [address] [facility]   MAIN OR                                  [phone]   MAIN PRE OP                          [phone]                                                                                AMBULATORY PRE OP          [phone]  PRE-ADMISSION TESTING    [phone]     Surgery Date: Wednesday, May 13, 2020      Is surgery arrival time given by surgeon? NO  If NO, [address] staff will call you between 3 and 7pm the day before your surgery with your arrival time. (If your surgery is on a Monday, we will call you the Friday before.)    Call [phone] after 7pm Monday-Friday if you did not receive this call.     INSTRUCTIONS BEFORE YOUR SURGERY   When You  Arrive Arrive at the [address] [facility] on the day of your surgery  Have your insurance card, photo ID, and any copayment (if needed)   Food   and   Drink NO food or drink after midnight the night before surgery    This means NO water, gum, mints, coffee, juice, etc.  No alcohol (beer, wine, liquor) 24 hours before and after surgery   Medications to   TAKE   Morning of Surgery MEDICATIONS TO TAKE THE MORNING OF SURGERY WITH A SIP OF WATER:    Wellbutrin   Medications  To  STOP      7 days before surgery  Non-Steroidal anti-inflammatory Drugs (NSAID's): for example, Ibuprofen (Advil, Motrin), Naproxen (Aleve)   Aspirin, if taking for pain    Herbal supplements, vitamins, and fish oil  (Pain medications not listed above, including Tylenol may be taken)   Blood  Thinners  Not applicable   Bathing Clothing  Jewelry  Valuables      If you shower the morning of surgery, please do not apply anything to your skin (lotions, powders, deodorant)   Do not shave or trim anywhere 24 hours before surgery   Wear your hair loose or down; no pony-tails, buns, or metal hair clips   Wear loose, comfortable, clean clothes   Wear glasses instead of contacts  [address], valuables, and jewelry, including body piercings, at home   Going Home - or Spending the Night  SAME-DAY SURGERY: You must have a responsible adult drive you home and stay with you 24 hours after surgery   ADMITS: If your doctor is keeping you in the hospital after surgery, leave personal belongings/luggage in your car until you have a hospital room number. Hospital discharge time is 12 noon  Drivers must be here before 12 noon unless you are told differently   Special Instructions One visitor may come into the hospital with patient on day of surgery and must stay inside building. Covid-19 swab to do done 3 days before surgery [place] May 10 between 10am and 12 noon at drive up testing in front of MOB entrance. Follow all instructions so your surgery wont be cancelled. Please, be on time. If a situation occurs and you are delayed the day of surgery, call [phone]. If your physical condition changes (like a fever, cold, flu, etc.) call your surgeon. Home medication(s) reviewed and verified verbally with patient during PAT appointment. The patient was contacted  via phone. The patient verbalizes understanding of all instructions and does not  need reinforcement.

## 2020-05-05 NOTE — PERIOP NOTES
LM requesting return call from Dr. Leyda Worthington office regarding patient unable to make COVID testing. Spoke with patient also and he has reached out to Dr. Ramin Cole office regarding rescheduling.

## 2020-05-07 NOTE — PROGRESS NOTES
Contacted patient at this time regarding COVID testing prior to [facility] 5/20/20. Pt is scheduled to arrive at [facility] on 5/17/20, between . Patient verbalizes understanding that surgery will not proceed as planned if screening appointment is missed or if COVID test is positive.

## 2020-05-16 ENCOUNTER — HOSPITAL ENCOUNTER (OUTPATIENT)
Dept: PREADMISSION TESTING | Age: 55
Discharge: HOME | End: 2020-05-16
Payer: COMMERCIAL

## 2020-05-16 PROCEDURE — 87635 SARS-COV-2 COVID-19 AMP PRB: CPT

## 2020-05-17 LAB — SARS-COV-2, COV2NT: NOT DETECTED

## 2020-05-19 ENCOUNTER — ANESTHESIA EVENT (OUTPATIENT)
Dept: SURGERY | Age: 55
End: 2020-05-19
Payer: COMMERCIAL

## 2020-05-19 NOTE — PERIOP NOTES
The H&P received from Dr. Brigitte Bañuelos office is outdated. Spoke to [address] at Dr. Brigitte Bañuelos office to make sure that they are aware of this. Per [address], Dr. Dilip Armendariz is aware and will do a full H&P on the day of surgery.   DOS: 5/20/2020

## 2020-05-20 ENCOUNTER — ANESTHESIA (OUTPATIENT)
Dept: SURGERY | Age: 55
End: 2020-05-20
Payer: COMMERCIAL

## 2020-05-20 ENCOUNTER — APPOINTMENT (OUTPATIENT)
Dept: GENERAL RADIOLOGY | Age: 55
End: 2020-05-20
Attending: PODIATRIST
Payer: COMMERCIAL

## 2020-05-20 ENCOUNTER — HOSPITAL ENCOUNTER (OUTPATIENT)
Age: 55
Setting detail: OUTPATIENT SURGERY
Discharge: HOME | End: 2020-05-20
Attending: PODIATRIST | Admitting: PODIATRIST
Payer: COMMERCIAL

## 2020-05-20 VITALS
TEMPERATURE: 97.6 F | DIASTOLIC BLOOD PRESSURE: 82 MMHG | SYSTOLIC BLOOD PRESSURE: 139 MMHG | BODY MASS INDEX: 36.33 KG/M2 | HEIGHT: 67 IN | WEIGHT: 231.48 LBS | HEART RATE: 63 BPM | RESPIRATION RATE: 12 BRPM | OXYGEN SATURATION: 97 %

## 2020-05-20 DIAGNOSIS — M79.672 LEFT FOOT PAIN: Primary | ICD-10-CM

## 2020-05-20 PROCEDURE — 74011000250 HC RX REV CODE- 250: Performed by: PODIATRIST

## 2020-05-20 PROCEDURE — 77030040922 HC BLNKT HYPOTHRM STRY -A

## 2020-05-20 PROCEDURE — 74011000250 HC RX REV CODE- 250: Performed by: NURSE ANESTHETIST, CERTIFIED REGISTERED

## 2020-05-20 PROCEDURE — 74011250636 HC RX REV CODE- 250/636: Performed by: ANESTHESIOLOGY

## 2020-05-20 PROCEDURE — 76060000035 HC ANESTHESIA 2 TO 2.5 HR: Performed by: PODIATRIST

## 2020-05-20 PROCEDURE — 77030031139 HC SUT VCRL2 J&J -A: Performed by: PODIATRIST

## 2020-05-20 PROCEDURE — 77030002986 HC SUT PROL J&J -A: Performed by: PODIATRIST

## 2020-05-20 PROCEDURE — 77030040361 HC SLV COMPR DVT MDII -B

## 2020-05-20 PROCEDURE — 77030006773 HC BLD SAW OSC BRSM -A: Performed by: PODIATRIST

## 2020-05-20 PROCEDURE — 76210000020 HC REC RM PH II FIRST 0.5 HR: Performed by: PODIATRIST

## 2020-05-20 PROCEDURE — 76210000006 HC OR PH I REC 0.5 TO 1 HR: Performed by: PODIATRIST

## 2020-05-20 PROCEDURE — 77030028224 HC PDNG CST BSNM -A: Performed by: PODIATRIST

## 2020-05-20 PROCEDURE — 74011250636 HC RX REV CODE- 250/636: Performed by: NURSE ANESTHETIST, CERTIFIED REGISTERED

## 2020-05-20 PROCEDURE — 77030000032 HC CUF TRNQT ZIMM -B: Performed by: PODIATRIST

## 2020-05-20 PROCEDURE — 77030011640 HC PAD GRND REM COVD -A: Performed by: PODIATRIST

## 2020-05-20 PROCEDURE — 73620 X-RAY EXAM OF FOOT: CPT

## 2020-05-20 PROCEDURE — 76010000131 HC OR TIME 2 TO 2.5 HR: Performed by: PODIATRIST

## 2020-05-20 PROCEDURE — 74011250636 HC RX REV CODE- 250/636: Performed by: PODIATRIST

## 2020-05-20 PROCEDURE — 77030018836 HC SOL IRR NACL ICUM -A: Performed by: PODIATRIST

## 2020-05-20 RX ORDER — DIPHENHYDRAMINE HYDROCHLORIDE 50 MG/ML
12.5 INJECTION, SOLUTION INTRAMUSCULAR; INTRAVENOUS AS NEEDED
Status: DISCONTINUED | OUTPATIENT
Start: 2020-05-20 | End: 2020-05-20 | Stop reason: HOSPADM

## 2020-05-20 RX ORDER — PROPOFOL 10 MG/ML
INJECTION, EMULSION INTRAVENOUS
Status: DISCONTINUED | OUTPATIENT
Start: 2020-05-20 | End: 2020-05-20 | Stop reason: HOSPADM

## 2020-05-20 RX ORDER — OXYCODONE AND ACETAMINOPHEN 5; 325 MG/1; MG/1
1 TABLET ORAL
Qty: 28 TAB | Refills: 0 | Status: SHIPPED | OUTPATIENT
Start: 2020-05-20 | End: 2020-05-27

## 2020-05-20 RX ORDER — BUPIVACAINE HYDROCHLORIDE 5 MG/ML
INJECTION, SOLUTION EPIDURAL; INTRACAUDAL AS NEEDED
Status: DISCONTINUED | OUTPATIENT
Start: 2020-05-20 | End: 2020-05-20 | Stop reason: HOSPADM

## 2020-05-20 RX ORDER — NALOXONE HYDROCHLORIDE 0.4 MG/ML
0.2 INJECTION, SOLUTION INTRAMUSCULAR; INTRAVENOUS; SUBCUTANEOUS
Status: DISCONTINUED | OUTPATIENT
Start: 2020-05-20 | End: 2020-05-20 | Stop reason: HOSPADM

## 2020-05-20 RX ORDER — SODIUM CHLORIDE, SODIUM LACTATE, POTASSIUM CHLORIDE, CALCIUM CHLORIDE 600; 310; 30; 20 MG/100ML; MG/100ML; MG/100ML; MG/100ML
125 INJECTION, SOLUTION INTRAVENOUS CONTINUOUS
Status: DISCONTINUED | OUTPATIENT
Start: 2020-05-20 | End: 2020-05-20 | Stop reason: HOSPADM

## 2020-05-20 RX ORDER — GLYCOPYRROLATE 0.2 MG/ML
INJECTION INTRAMUSCULAR; INTRAVENOUS AS NEEDED
Status: DISCONTINUED | OUTPATIENT
Start: 2020-05-20 | End: 2020-05-20 | Stop reason: HOSPADM

## 2020-05-20 RX ORDER — IBUPROFEN 200 MG
800 TABLET ORAL
Qty: 60 TAB | Refills: 0 | Status: SHIPPED | OUTPATIENT
Start: 2020-05-20

## 2020-05-20 RX ORDER — LIDOCAINE HYDROCHLORIDE 10 MG/ML
0.1 INJECTION, SOLUTION EPIDURAL; INFILTRATION; INTRACAUDAL; PERINEURAL AS NEEDED
Status: DISCONTINUED | OUTPATIENT
Start: 2020-05-20 | End: 2020-05-20 | Stop reason: HOSPADM

## 2020-05-20 RX ORDER — LIDOCAINE HYDROCHLORIDE 20 MG/ML
INJECTION, SOLUTION INFILTRATION; PERINEURAL AS NEEDED
Status: DISCONTINUED | OUTPATIENT
Start: 2020-05-20 | End: 2020-05-20 | Stop reason: HOSPADM

## 2020-05-20 RX ORDER — MIDAZOLAM HYDROCHLORIDE 1 MG/ML
INJECTION, SOLUTION INTRAMUSCULAR; INTRAVENOUS AS NEEDED
Status: DISCONTINUED | OUTPATIENT
Start: 2020-05-20 | End: 2020-05-20 | Stop reason: HOSPADM

## 2020-05-20 RX ORDER — FENTANYL CITRATE 50 UG/ML
INJECTION, SOLUTION INTRAMUSCULAR; INTRAVENOUS AS NEEDED
Status: DISCONTINUED | OUTPATIENT
Start: 2020-05-20 | End: 2020-05-20 | Stop reason: HOSPADM

## 2020-05-20 RX ORDER — PROPOFOL 10 MG/ML
INJECTION, EMULSION INTRAVENOUS AS NEEDED
Status: DISCONTINUED | OUTPATIENT
Start: 2020-05-20 | End: 2020-05-20 | Stop reason: HOSPADM

## 2020-05-20 RX ORDER — DEXAMETHASONE SODIUM PHOSPHATE 4 MG/ML
INJECTION, SOLUTION INTRA-ARTICULAR; INTRALESIONAL; INTRAMUSCULAR; INTRAVENOUS; SOFT TISSUE AS NEEDED
Status: DISCONTINUED | OUTPATIENT
Start: 2020-05-20 | End: 2020-05-20 | Stop reason: HOSPADM

## 2020-05-20 RX ORDER — ONDANSETRON 2 MG/ML
INJECTION INTRAMUSCULAR; INTRAVENOUS AS NEEDED
Status: DISCONTINUED | OUTPATIENT
Start: 2020-05-20 | End: 2020-05-20 | Stop reason: HOSPADM

## 2020-05-20 RX ORDER — HYDROMORPHONE HYDROCHLORIDE 1 MG/ML
.25-1 INJECTION, SOLUTION INTRAMUSCULAR; INTRAVENOUS; SUBCUTANEOUS
Status: DISCONTINUED | OUTPATIENT
Start: 2020-05-20 | End: 2020-05-20 | Stop reason: HOSPADM

## 2020-05-20 RX ORDER — FLUMAZENIL 0.1 MG/ML
0.2 INJECTION INTRAVENOUS
Status: DISCONTINUED | OUTPATIENT
Start: 2020-05-20 | End: 2020-05-20 | Stop reason: HOSPADM

## 2020-05-20 RX ADMIN — MIDAZOLAM HYDROCHLORIDE 1 MG: 1 INJECTION, SOLUTION INTRAMUSCULAR; INTRAVENOUS at 13:23

## 2020-05-20 RX ADMIN — PROPOFOL 140 MG: 10 INJECTION, EMULSION INTRAVENOUS at 13:56

## 2020-05-20 RX ADMIN — MIDAZOLAM HYDROCHLORIDE 1 MG: 1 INJECTION, SOLUTION INTRAMUSCULAR; INTRAVENOUS at 13:37

## 2020-05-20 RX ADMIN — PROPOFOL 20 MG: 10 INJECTION, EMULSION INTRAVENOUS at 13:17

## 2020-05-20 RX ADMIN — FENTANYL CITRATE 25 MCG: 0.05 INJECTION, SOLUTION INTRAMUSCULAR; INTRAVENOUS at 13:22

## 2020-05-20 RX ADMIN — FENTANYL CITRATE 25 MCG: 0.05 INJECTION, SOLUTION INTRAMUSCULAR; INTRAVENOUS at 13:20

## 2020-05-20 RX ADMIN — PROPOFOL 20 MG: 10 INJECTION, EMULSION INTRAVENOUS at 13:37

## 2020-05-20 RX ADMIN — MIDAZOLAM HYDROCHLORIDE 1 MG: 1 INJECTION, SOLUTION INTRAMUSCULAR; INTRAVENOUS at 13:15

## 2020-05-20 RX ADMIN — ONDANSETRON 4 MG: 2 INJECTION INTRAMUSCULAR; INTRAVENOUS at 14:52

## 2020-05-20 RX ADMIN — FENTANYL CITRATE 25 MCG: 0.05 INJECTION, SOLUTION INTRAMUSCULAR; INTRAVENOUS at 14:50

## 2020-05-20 RX ADMIN — PROPOFOL 10 MG: 10 INJECTION, EMULSION INTRAVENOUS at 13:21

## 2020-05-20 RX ADMIN — LIDOCAINE HYDROCHLORIDE 60 MG: 20 INJECTION, SOLUTION INFILTRATION; PERINEURAL at 13:16

## 2020-05-20 RX ADMIN — SODIUM CHLORIDE, SODIUM LACTATE, POTASSIUM CHLORIDE, AND CALCIUM CHLORIDE 125 ML/HR: 600; 310; 30; 20 INJECTION, SOLUTION INTRAVENOUS at 12:02

## 2020-05-20 RX ADMIN — GLYCOPYRROLATE 0.1 MG: 0.2 INJECTION, SOLUTION INTRAMUSCULAR; INTRAVENOUS at 14:30

## 2020-05-20 RX ADMIN — PROPOFOL 20 MG: 10 INJECTION, EMULSION INTRAVENOUS at 13:27

## 2020-05-20 RX ADMIN — MIDAZOLAM HYDROCHLORIDE 2 MG: 1 INJECTION, SOLUTION INTRAMUSCULAR; INTRAVENOUS at 13:08

## 2020-05-20 RX ADMIN — PROPOFOL 10 MG: 10 INJECTION, EMULSION INTRAVENOUS at 13:41

## 2020-05-20 RX ADMIN — FENTANYL CITRATE 25 MCG: 0.05 INJECTION, SOLUTION INTRAMUSCULAR; INTRAVENOUS at 14:04

## 2020-05-20 RX ADMIN — DEXAMETHASONE SODIUM PHOSPHATE 4 MG: 4 INJECTION, SOLUTION INTRAMUSCULAR; INTRAVENOUS at 14:30

## 2020-05-20 RX ADMIN — SODIUM CHLORIDE, POTASSIUM CHLORIDE, SODIUM LACTATE AND CALCIUM CHLORIDE: 600; 310; 30; 20 INJECTION, SOLUTION INTRAVENOUS at 13:00

## 2020-05-20 RX ADMIN — PROPOFOL 20 MG: 10 INJECTION, EMULSION INTRAVENOUS at 13:31

## 2020-05-20 RX ADMIN — PROPOFOL 50 MCG/KG/MIN: 10 INJECTION, EMULSION INTRAVENOUS at 13:15

## 2020-05-20 RX ADMIN — CEFAZOLIN SODIUM 2 G: 1 POWDER, FOR SOLUTION INTRAMUSCULAR; INTRAVENOUS at 13:20

## 2020-05-20 NOTE — DISCHARGE INSTRUCTIONS
[employer] [id number]  [facility]  OFFICE [phone]  CELL    [phone]    You have just had surgery on your foot and/or ankle. Proper care during the post-operative period is an integral part of your surgical treatment program.  It is imperative that these instructions are followed to insure proper healing and to obtain the best results. 1. GO directly home and keep your feet elevated on the way. 2. DRESSING OR CAST - Keep your dressing or cast clean and dry. DO NOT remove the bandage or inspect the wound. A small amount of blood on the bandage is normal.  If the dressing falls off or gets wet, call the office immediately. 3. ELEVATION - Place two pillows under the knee and leg. Make sure to support underneath your knees. You should elevate your leg whenever you are sitting or lying down. This includes mealtime and when retiring for the night. 4. ICE - Apply 1 or 2 small bags of ice close to, BUT NOT DIRECTLY OVER, the surgical site. The ice should be ON FOR TWENTY MINUTES, THEN OFF FOR ONE HALF HOUR. Continue this sequence throughout the day. Remember to keep the dressing or cast dry. Double-bagging the ice will help. 5. Limited pain and swelling is expected. 6. Exercise your legs frequently by bending your knees to stimulate circulation and speed healing. 7. You are to be:   · PARTIAL WEIGHT BEARING - allowed to walk or stand with the aid of crutches or a walker to tolerance. 8. If you have a surgical shoe - it should be worn whenever you are standing or walking. 9. MEALS - Your first meal at home should be a light one such as toast or soup. If nausea and vomiting develop call the office immediately. Drink plenty of fluid and resume a well balanced diet. 10. Sponge baths are recommended until your first post-operative appointment. 11. PRESCRIPTIONS - Please fill and take as directed.   If you have any difficulties or experience any side effects after taking this medication please discontinue and call the office and/or go to your closest Emergency Room immediately. Call our office to make your next appointment; Also, if you have any of the following:  · Temperature above 100.5 degrees  · Your bandage becomes overly stained, falls off or gets wet  · You bump or injure the surgical site  · Your medication does not stop discomfort    WE WANT YOUR SURGERY AND RECOVERY TO BE SUCCESSFUL AND AS COMFORTABLE AS POSSIBLE. THANK YOU FOR FOLLOWING THESE INSTRUCTIONS. IF YOU HAVE ANY PROBLEMS OR QUESTIONS PLEASE CALL OUR OFFICE. DISCHARGE SUMMARY from your Nurse      PATIENT INSTRUCTIONS    After general anesthesia or intravenous sedation, for 24 hours or while taking prescription Narcotics:  · Limit your activities  · Do not drive and operate hazardous machinery  · Do not make important personal or business decisions  · Do  not drink alcoholic beverages  · If you have not urinated within 8 hours after discharge, please contact your surgeon on call. Report the following to your surgeon:  · Excessive pain, swelling, redness or odor of or around the surgical area  · Temperature over 100.5  · Nausea and vomiting lasting longer than 4 hours or if unable to take medications  · Any signs of decreased circulation or nerve impairment to extremity: change in color, persistent  numbness, tingling, coldness or increase pain  · Any questions      GOOD HELP TO FIGHT AN INFECTION  Here are a few tip to help reduce the chance of getting an infection after surgery:   Wash Your Hands   Good handwashing is the most important thing you and your caregiver can do.  Wash before and after caring for any wounds. Dry your hand with a clean towel.  Wash with soap and water for at least 20 seconds. A TIP: sing the \"Happy Birthday\" song through one time while washing to help with the timing.    Use a hand  in between washings.  Shower   When your surgeon says it is OK to take a shower, use a new bar of antibacterial soap (if that is what you use, and keep that bar of soap ONLY for your use), or antibacterial body wash.  Use a clean wash cloth or sponge when you bathe.  Dry off with a clean towel  after every bath - be careful around any wounds, skin staples, sutures or surgical glue over/on wounds.  Do not enter swimming pools, hot tubs, lakes, rivers and/or ocean until wounds are healed and your doctor/surgeon says it is OK.  Use Clean Sheets   Sleep on freshly laundered sheets after your surgery.  Keep the surgery site covered with a clean, dry bandage (if instructed to do so). If the bandage becomes soiled, reapply a new, dry, clean bandage.  Do not allow pets to sleep with you while your wound is healing.  Lifestyle Modification and Controlling Your Blood Sugar   Smoking slows wound healing. Stop smoking and limit exposure to second-hand smoke.  High blood sugar slows wound healing. Eat a well-balanced diet to provide proper nutrition while healing   Monitor your blood sugar (if you are a diabetic) and take your medications as you are suppose to so you can control you blood sugar after surgery. COUGH AND DEEP BREATHE    Breathing deeply and coughing are very important exercises to do after surgery. Deep breathing and coughing open the little air tubes and air sacks in your lungs. You take deep breaths every day. You may not even notice - it is just something you do when you sigh or yawn. It is a natural exercise you do to keep these air passages open. After surgery, take deep breaths and cough, on purpose. DIRECTIONS:  · Take 10 to 15 slow deep breaths every hour while awake. · Breathe in deeply, and hold it for 2 seconds. · Exhale slowly through puckered lips, like blowing up a balloon.   · After every 4th or 5th deep breath, hug your pillow to your chest or belly and give a hard, deep cough. Yes, it will probably hurt. But doing this exercise is a very important part of healing after surgery. Take your pain medicine to help you do this exercise without too much pain. Coughing and deep breathing help prevent bronchitis and pneumonia after surgery. If you had chest or belly surgery, use a pillow as a \"hug faustino\" and hold it tightly to your chest or belly when you cough. ANKLE PUMPS    Ankle pumps increase the circulation of oxygenated blood to your lower extremities and decrease your risk for circulation problems such as blood clots. They also stretch the muscles, tendons and ligaments in your foot and ankle, and prevent joint contracture in the ankle and foot, especially after surgeries on the legs. It is important to do ankle pump exercises regularly after surgery because immobility increases your risk for developing a blood clot. Your doctor may also have you take an Aspirin for the next few days as well. If your doctor did not ask you to take an Aspirin, consult with him before starting Aspirin therapy on your own. The exercise is quite simple. · Slowly point your foot forward, feeling the muscles on the top of your lower leg stretch, and hold this position for 5 seconds. · Next, pull your foot back toward you as far as possible, stretching the calf muscles, and hold that position for 5 seconds. · Repeat with the other foot. · Perform 10 repetitions every hour while awake for both ankles if possible (down and then up with the foot once is one repetition). You should feel gentle stretching of the muscles in your lower leg when doing this exercise. If you feel pain, or your range of motion is limited, don't push too hard. Only go the limit your joint and muscles will let you go. If you have increasing pain, progressively worsening leg warmth or swelling, STOP the exercise and call your doctor. MEDICATION AND   SIDE EFFECT GUIDE    [facility] MEDICATION AND SIDE EFFECT GUIDE was provided to the PATIENT AND CARE PROVIDER. Information provided includes instruction about drug purpose and common side effects for the following medications:   · ***        These are general instructions for a healthy lifestyle:    *   Please give a list of your current medications to your Primary Care Provider. *   Please update this list whenever your medications are discontinued, doses are changed, or new medications (including over-the-counter products) are added. *   Please carry medication information at all times in case of emergency situations. About Smoking  No smoking / No tobacco products  Avoid exposure to second hand smoke     Surgeon General's Warning:  Quitting smoking now greatly reduces serious risk to your health. Obesity, smoking, and sedentary lifestyle greatly increases your risk for illness and disease. A healthy diet, regular physical exercise & weight monitoring are important for maintaining a healthy lifestyle. Congestive Heart Failure  You may be retaining fluid if you have a history of heart failure or if you experience any of the following symptoms:  Weight gain of 3 pounds or more overnight or 5 pounds in a week, increased swelling in your hands or feet or shortness of breath while lying flat in bed. Please call your doctor as soon as you notice any of these symptoms; do not wait until your next office visit. Recognize signs and symptoms of STROKE:  F -  Face looks uneven  A -  Arms unable to move or move evenly  S -  Speech slurred or non-existent  T -  Time-call 911 as soon as signs and symptoms begin-DO NOT go          back to bed or wait to see if you get better-TIME IS BRAIN. Warning Signs of HEART ATTACK   Call 911 if you have these symptoms:     Chest discomfort.  Most heart attacks involve discomfort in the center of the chest that lasts more than a few minutes, or that goes away and comes back. It can feel like uncomfortable pressure, squeezing, fullness, or pain.  Discomfort in other areas of the upper body. Symptoms can include pain or discomfort in one or both arms, the back, neck, jaw, or stomach.  Shortness of breath with or without chest discomfort.  Other signs may include breaking out in a cold sweat, nausea, or lightheadedness. Don't wait more than five minutes to call 911 - MINUTES MATTER! Fast action can save your life. Calling 911 is almost always the fastest way to get lifesaving treatment. Emergency Medical Services staff can begin treatment when they arrive -- up to an hour sooner than if someone gets to the hospital by car. Learning About Coronavirus [phone])  Coronavirus [phone]): Overview  What is coronavirus (COVID-19)? The coronavirus disease (COVID-19) is caused by a virus. It is an illness that was first found in [place], [place], in December 2019. It has since spread worldwide. The virus can cause fever, cough, and trouble breathing. In severe cases, it can cause pneumonia and make it hard to breathe without help. It can cause death. Coronaviruses are a large group of viruses. They cause the common cold. They also cause more serious illnesses like Middle East respiratory syndrome (MERS) and severe acute respiratory syndrome (SARS). COVID-19 is caused by a novel coronavirus. That means it's a new type that has not been seen in people before. This virus spreads person-to-person through droplets from coughing and sneezing. It can also spread when you are close to someone who is infected. And it can spread when you touch something that has the virus on it, such as a doorknob or a tabletop. What can you do to protect yourself from coronavirus (COVID-19)? The best way to protect yourself from getting sick is to:  · Avoid areas where there is an outbreak. · Avoid contact with people who may be infected.   · Wash your hands often with soap or alcohol-based hand sanitizers. · Avoid crowds and try to stay at least 6 feet away from other people. · Wash your hands often, especially after you cough or sneeze. Use soap and water, and scrub for at least 20 seconds. If soap and water aren't available, use an alcohol-based hand . · Avoid touching your mouth, nose, and eyes. What can you do to avoid spreading the virus to others? To help avoid spreading the virus to others:  · Cover your mouth with a tissue when you cough or sneeze. Then throw the tissue in the trash. · Use a disinfectant to clean things that you touch often. · Stay home if you are sick or have been exposed to the virus. Don't go to school, work, or public areas. And don't use public transportation. · If you are sick:  ? Leave your home only if you need to get medical care. But call the doctor's office first so they know you're coming. And wear a face mask, if you have one.  ? If you have a face mask, wear it whenever you're around other people. It can help stop the spread of the virus when you cough or sneeze. ? Clean and disinfect your home every day. Use household  and disinfectant wipes or sprays. Take special care to clean things that you grab with your hands. These include doorknobs, remote controls, phones, and handles on your refrigerator and microwave. And don't forget countertops, tabletops, bathrooms, and computer keyboards. When to call for help  Call 911 anytime you think you may need emergency care. For example, call if:  · You have severe trouble breathing. (You can't talk at all.)  · You have constant chest pain or pressure. · You are severely dizzy or lightheaded. · You are confused or can't think clearly. · Your face and lips have a blue color. · You pass out (lose consciousness) or are very hard to wake up. Call your doctor now if you develop symptoms such as:  · Shortness of breath. · Fever. · Cough.   If you need to get care, call ahead to the doctor's office for instructions before you go. Make sure you wear a face mask, if you have one, to prevent exposing other people to the virus. Where can you get the latest information? The following health organizations are tracking and studying this virus. Their websites contain the most up-to-date information. Joan Bolden also learn what to do if you think you may have been exposed to the virus. · U.S. Centers for Disease Control and Prevention (CDC): The CDC provides updated news about the disease and travel advice. The website also tells you how to prevent the spread of infection. www.cdc.gov  · World Health Organization [facility]): WHO offers information about the virus outbreaks. WHO also has travel advice. www.who.int  Current as of: April 1, 2020               Content Version: 12.4  © 2006-2020 [employer]. Care instructions adapted under license by your healthcare professional. If you have questions about a medical condition or this instruction, always ask your healthcare professional. [address] any warranty or liability for your use of this information. The discharge information has been reviewed with the {PATIENT PARENT GUARDIAN:65103}. Any questions and concerns from the {PATIENT PARENT GUARDIAN:41633} have been addressed. The {PATIENT PARENT GUARDIAN:96360} verbalized understanding. Other information in your discharge envelope:  []     PRESCRIPTIONS  []     PHYSICAL THERAPY PRESCRIPTION  []     APPOINTMENT CARDS  []     Regional Anesthesia Pamphlet for block or block with On-Q Catheter from   Anesthesia Service  []     Medical device information sheets/pamphlets from their    []     School/work excuse note. []     /parent work excuse note.         The following personal items collected during your admission are returned to you:   Dental Appliance: Dental Appliances: None  Vision: Visual Aid: Glasses  Hearing Aid: Jewelry: Jewelry: None  Clothing: Clothing: Footwear, Pants, Shirt, Socks, Undergarments  Other Valuables:  Other Valuables: Eyeglasses, Cell Phone(cell phone to wife, glasses to PACU)  Valuables sent to safe:

## 2020-05-20 NOTE — ANESTHESIA POSTPROCEDURE EVALUATION
Procedure(s):  LEFT FOOT SILVER BUNIONECTOMY AND FIRST INTERPHALANGEAL JOINT ARTHROTOMY. general    Anesthesia Post Evaluation        Patient location during evaluation: bedside  Level of consciousness: awake  Pain management: satisfactory to patient  Airway patency: patent  Anesthetic complications: no  Cardiovascular status: acceptable  Respiratory status: acceptable  Hydration status: acceptable        Vitals Value Taken Time   /79 5/20/2020  3:35 PM   Temp 36.9 °C (98.4 °F) 5/20/2020  3:33 PM   Pulse 67 5/20/2020  3:37 PM   Resp 15 5/20/2020  3:37 PM   SpO2 100 % 5/20/2020  3:33 PM   Vitals shown include unvalidated device data.

## 2020-05-20 NOTE — BRIEF OP NOTE
Brief Postoperative Note    Patient: Freya Greenberg  YOB: 1965  MRN: [id number]    Date of Procedure: 5/20/2020     Pre-Op Diagnosis: LEFT FOOT BUNION, FIRST IPJ ARTHRITIS AND BONE SPURS. Post-Op Diagnosis: Same as preoperative diagnosis. Procedure(s):  LEFT FOOT SILVER BUNIONECTOMY AND FIRST INTERPHALANGEAL JOINT ARTHROTOMY WITH BONE REMOVAL. Surgeon(s):  Day Russ DPM    Surgical Assistant: None    Anesthesia: MAC     Estimated Blood Loss (mL): less than 50     Complications: None    Specimens: * No specimens in log *     Implants: * No implants in log *    Drains:   [REMOVED] Hemovac Posterior Back (Removed)       Findings: ARTHRITIS AND BONE SPURS.     Electronically Signed by Feliciano Butterfield DPM on 5/20/2020 at 3:41 PM

## 2020-05-20 NOTE — ANESTHESIA PREPROCEDURE EVALUATION
Relevant Problems   No relevant active problems       Anesthetic History   No history of anesthetic complications            Review of Systems / Medical History  Patient summary reviewed, nursing notes reviewed and pertinent labs reviewed    Pulmonary        Sleep apnea           Neuro/Psych   Within defined limits           Cardiovascular  Within defined limits                     GI/Hepatic/Renal  Within defined limits              Endo/Other        Arthritis     Other Findings              Physical Exam    Airway  Mallampati: II  TM Distance: 4 - 6 cm  Neck ROM: normal range of motion   Mouth opening: Normal     Cardiovascular    Rhythm: regular  Rate: normal         Dental  No notable dental hx       Pulmonary  Breath sounds clear to auscultation               Abdominal         Other Findings            Anesthetic Plan    ASA: 2  Anesthesia type: MAC            Anesthetic plan and risks discussed with: Patient

## 2020-05-20 NOTE — H&P
History and Physical    Subjective:         Patient is a 53 y.o.  male who is being seen for Left foot bunion and first IPJ pain. Workup has revealed failure of conservative treatment including change in shoes and padding. Patient elects surgical intervetion. NPO since midnight. Denies changes in medical history since medical clearance. Patient Active Problem List    Diagnosis Date Noted    Infection of right prosthetic hip joint ([facility] [place] [address] 2017    Chronic infection of hip joint prosthesis ([facility] [place] [address] 2016    Lumbar stenosis 2016    Degenerative joint disease of right hip 2016    Osteoarthritis of left hip 2016     Past Medical History:   Diagnosis Date    Arthritis     HIPS    Chronic pain     Sleep apnea     IN THE PAST. CURRENTLY NO C-PAP     Past Surgical History:   Procedure Laterality Date    HX HIP REPLACEMENT Left 3/2016    DR. Villatoro ([place])    HX HIP REPLACEMENT Right     HX LUMBAR FUSION  2016    HX LUMBAR LAMINECTOMY      HX ORTHOPAEDIC Right 2016    REMOVAL OF ARTIFICIAL HIP AND TEMPORARY PUT IN    HX VASECTOMY      HX WISDOM TEETH EXTRACTION      TOTAL HIP ARTHROPLASTY Left 2016    L HIP REPLACEMENT    TOTAL HIP ARTHROPLASTY Right 2016    TOTAL HIP       Family History   Problem Relation Age of Onset    Arthritis-osteo Mother     Arthritis-osteo Father     COPD Father     No Known Problems Sister     Anesth Problems Neg Hx     Alcohol abuse Neg Hx       Social History     Tobacco Use    Smoking status: Former Smoker     Packs/day: 0.25     Years: 15.00     Pack years: 3.75     Last attempt to quit: 2020     Years since quittin.0    Smokeless tobacco: Never Used   Substance Use Topics    Alcohol use: Yes     Comment: OCCASIONALLY     No Known Allergies  Prior to Admission medications    Medication Sig Start Date End Date Taking?  Authorizing Provider   buPROPion XL (WELLBUTRIN XL) 300 mg XL tablet Take 300 mg by mouth every morning. Yes Provider, Historical   ibuprofen (AdviL) 200 mg tablet Take 200 mg by mouth as needed for Pain. Yes Provider, Historical       Review of Systems   Left foot pain. Objective:     Patient Vitals for the past 8 hrs:   BP Temp Pulse Resp SpO2 Height Weight   20 1042 113/77 98.2 °F (36.8 °C) (!) 57 15 98 % 5' 7\" (1.702 m) 105 kg (231 lb 7.7 oz)     Temp (24hrs), Av.2 °F (36.8 °C), Min:98.2 °F (36.8 °C), Max:98.2 °F (36.8 °C)      Physical Exam   Left and Right foot DP/PT 2/4. CRT <3 seconds. No open wounds. Neurological status is intact. Left foot limited ROM of 1st MPJ and IPJ with dorsal and lateral bone spurring. Pain with palpation and ROM left 1st MPJ and IPJ. Heart - RRR, No MRG. Lungs - Clear to auscultation. Data Review: No results found for this or any previous visit (from the past 24 hour(s)). Impression:     Hallux limitus and hallux IPJ arthritis. Recommendation:     Patient education for conditions. Consider surgical intervention. NPO since midnight. Consent signed and in chart. All risks, Complications and benefits explained. No guaranteed made to the outcome of the procedure.     To OR for left foot Silver bunionectomy and IPJ arthrotomy with bone removal.

## 2020-05-22 NOTE — OP NOTES
[facility] 78  OPERATIVE REPORT    Name:  Paz Gannon  MR#:  [id number]  :  1965  ACCOUNT #:  [de-identified]  DATE OF SERVICE:  2020    PREOPERATIVE DIAGNOSES:  1. Left foot hallux valgus. 2.  Left foot hallux limitus. 3.  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 POSTOPERATIVE DIAGNOSIS:    1. Left foot hallux valgus. 2.  Left foot hallux limitus. 3.  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 PROCEDURES PERFORMED:  1. Left foot bunionectomy. 2.  Left foot hallux IPJ arthrotomy and bone removal.    SURGEON:  Babak Russ DPM    ASSISTANT:  None. ANESTHESIA USED:  General with 15 mL of 1:1 ratio of 1% lidocaine plain and 0.5% Marcaine plain. COMPLICATIONS:  None. SPECIMENS REMOVED:  None. IMPLANTS:  None. ESTIMATED BLOOD LOSS:  Minimal.    PATHOLOGY SENT:  None. HEMOSTASIS:  Achieved with a pneumatic ankle tourniquet at 250 mmHg. MATERIALS USED:  3-0 Vicryl and 3-0 Prolene. INJECTIBLES:  10 mL of 0.5% Marcaine plain. CONDITION:  Stable. INDICATION FOR PROCEDURE:  The patient is a 78-year-old male, presented to the preoperative holding area for left foot pain at the first MPJ and IPJ. He has tried conservative treatment and failed. He would like and require surgical intervention to improve his pain. Upon review of x-ray, there is a dorsal eminence of the MPJ and IPJ with hallux valgus deformity. Surgical procedure was reviewed with the patient along with postoperative course. All risks, complications, and benefits were explained. No guarantees were made to the outcome of the procedure. The patient elects to continue with surgery to improve his pain. PROCEDURE IN DETAIL:  The patient was identified in the preoperative holding area. Site and side were marked. Consent was signed. The patient was taken back to the operating room and sedated.   Attention was directed to the left lower extremity where left ankle tourniquet was placed on the left ankle. The left foot was sterilely prepped and draped. Timeout was performed. The left foot was injected with 15 mL of 1:1 mixture of 1% lidocaine plain and 0.5% Marcaine plain proximal to the incision site. Esmarch was used to exsanguinate the left foot and the tourniquet was inflated. A skin marker was used to draw an incision line over the first MPJ and IPJ. A 15 blade was used to make the incision. Blunt dissection was performed with the Metzenbaum. A new 15-blade was used to incise through the periosteum and capsule. All vital structures including neurovascular bundles and tendon were retracted while incising through the periosteum and capsule. Reflection was performed to the periosteum and good visualization of the MPJ and IPJ was achieved. Upon inspection, there was significant arthritic changes in both joints along with medial and dorsal bone spurring. A sagittal saw was then used to remove all bone spurs. A rasp was used to smooth all areas where bone spurs were removed. The area was irrigated with normal saline and closed with 3-0 Vicryl and 3-0 Prolene. The patient's left foot was injected with 10 mL of 0.5% Marcaine plain, dressed with Betadine-soaked Adaptic, dry sterile dressing and Coban for compression. The patient was given appropriate postoperative instructions to remain partial weightbearing on the left foot. He was given pain medications which include Percocet and Motrin. He is to followup with me in one week.       Gildardo Hutchins DPM      [id number]  D:  05/21/2020 18:53  T:  05/22/2020 3:39  JOB #:  [id number]  CC:  Pj Raines DPM

## 2020-06-30 ENCOUNTER — ANESTHESIA EVENT (OUTPATIENT)
Dept: SURGERY | Age: 55
End: 2020-06-30
Payer: COMMERCIAL

## 2020-06-30 ENCOUNTER — HOSPITAL ENCOUNTER (OUTPATIENT)
Dept: PREADMISSION TESTING | Age: 55
Discharge: HOME | End: 2020-06-30
Payer: COMMERCIAL

## 2020-06-30 PROCEDURE — 87635 SARS-COV-2 COVID-19 AMP PRB: CPT

## 2020-07-01 LAB — SARS-COV-2, COV2NT: NOT DETECTED

## 2020-07-03 ENCOUNTER — HOSPITAL ENCOUNTER (OUTPATIENT)
Age: 55
Setting detail: OUTPATIENT SURGERY
Discharge: HOME | End: 2020-07-03
Attending: PODIATRIST | Admitting: PODIATRIST
Payer: COMMERCIAL

## 2020-07-03 ENCOUNTER — ANESTHESIA (OUTPATIENT)
Dept: SURGERY | Age: 55
End: 2020-07-03
Payer: COMMERCIAL

## 2020-07-03 ENCOUNTER — APPOINTMENT (OUTPATIENT)
Dept: GENERAL RADIOLOGY | Age: 55
End: 2020-07-03
Attending: PODIATRIST
Payer: COMMERCIAL

## 2020-07-03 VITALS
TEMPERATURE: 97.6 F | OXYGEN SATURATION: 99 % | WEIGHT: 235.23 LBS | HEART RATE: 75 BPM | HEIGHT: 67 IN | DIASTOLIC BLOOD PRESSURE: 80 MMHG | BODY MASS INDEX: 36.92 KG/M2 | SYSTOLIC BLOOD PRESSURE: 143 MMHG | RESPIRATION RATE: 19 BRPM

## 2020-07-03 LAB
HBV SURFACE AG SER QL: 0.33 INDEX
HBV SURFACE AG SER QL: NEGATIVE
HCV AB SERPL QL IA: NONREACTIVE
HCV COMMENT,HCGAC: NORMAL
HIV1 P24 AG SERPL QL IA: NONREACTIVE
HIV1+2 AB SERPL QL IA: NONREACTIVE

## 2020-07-03 PROCEDURE — 76010000153 HC OR TIME 1.5 TO 2 HR: Performed by: PODIATRIST

## 2020-07-03 PROCEDURE — 74011250636 HC RX REV CODE- 250/636: Performed by: PODIATRIST

## 2020-07-03 PROCEDURE — 77030002986 HC SUT PROL J&J -A: Performed by: PODIATRIST

## 2020-07-03 PROCEDURE — 77030018836 HC SOL IRR NACL ICUM -A: Performed by: PODIATRIST

## 2020-07-03 PROCEDURE — 36415 COLL VENOUS BLD VENIPUNCTURE: CPT

## 2020-07-03 PROCEDURE — 77030020143 HC AIRWY LARYN INTUB CGAS -A: Performed by: ANESTHESIOLOGY

## 2020-07-03 PROCEDURE — 74011250636 HC RX REV CODE- 250/636: Performed by: ANESTHESIOLOGY

## 2020-07-03 PROCEDURE — 77030011640 HC PAD GRND REM COVD -A: Performed by: PODIATRIST

## 2020-07-03 PROCEDURE — 74011000250 HC RX REV CODE- 250: Performed by: ANESTHESIOLOGY

## 2020-07-03 PROCEDURE — 76210000063 HC OR PH I REC FIRST 0.5 HR: Performed by: PODIATRIST

## 2020-07-03 PROCEDURE — 77030040361 HC SLV COMPR DVT MDII -B

## 2020-07-03 PROCEDURE — 77030028224 HC PDNG CST BSNM -A: Performed by: PODIATRIST

## 2020-07-03 PROCEDURE — 77030036687 HC SHOE PSTOP S2SG -A

## 2020-07-03 PROCEDURE — 76060000034 HC ANESTHESIA 1.5 TO 2 HR: Performed by: PODIATRIST

## 2020-07-03 PROCEDURE — 76210000020 HC REC RM PH II FIRST 0.5 HR: Performed by: PODIATRIST

## 2020-07-03 PROCEDURE — 73620 X-RAY EXAM OF FOOT: CPT

## 2020-07-03 PROCEDURE — 77030000032 HC CUF TRNQT ZIMM -B: Performed by: PODIATRIST

## 2020-07-03 PROCEDURE — 77030040922 HC BLNKT HYPOTHRM STRY -A

## 2020-07-03 PROCEDURE — 77030002933 HC SUT MCRYL J&J -A: Performed by: PODIATRIST

## 2020-07-03 PROCEDURE — 74011250637 HC RX REV CODE- 250/637: Performed by: PODIATRIST

## 2020-07-03 PROCEDURE — 74011000250 HC RX REV CODE- 250: Performed by: PODIATRIST

## 2020-07-03 PROCEDURE — 77030031139 HC SUT VCRL2 J&J -A: Performed by: PODIATRIST

## 2020-07-03 PROCEDURE — 77030006773 HC BLD SAW OSC BRSM -A: Performed by: PODIATRIST

## 2020-07-03 RX ORDER — ALBUTEROL SULFATE 0.83 MG/ML
2.5 SOLUTION RESPIRATORY (INHALATION) AS NEEDED
Status: DISCONTINUED | OUTPATIENT
Start: 2020-07-03 | End: 2020-07-03 | Stop reason: HOSPADM

## 2020-07-03 RX ORDER — FENTANYL CITRATE 50 UG/ML
INJECTION, SOLUTION INTRAMUSCULAR; INTRAVENOUS AS NEEDED
Status: DISCONTINUED | OUTPATIENT
Start: 2020-07-03 | End: 2020-07-03 | Stop reason: HOSPADM

## 2020-07-03 RX ORDER — DIPHENHYDRAMINE HYDROCHLORIDE 50 MG/ML
12.5 INJECTION, SOLUTION INTRAMUSCULAR; INTRAVENOUS AS NEEDED
Status: DISCONTINUED | OUTPATIENT
Start: 2020-07-03 | End: 2020-07-03 | Stop reason: HOSPADM

## 2020-07-03 RX ORDER — LIDOCAINE HYDROCHLORIDE 20 MG/ML
INJECTION, SOLUTION EPIDURAL; INFILTRATION; INTRACAUDAL; PERINEURAL AS NEEDED
Status: DISCONTINUED | OUTPATIENT
Start: 2020-07-03 | End: 2020-07-03 | Stop reason: HOSPADM

## 2020-07-03 RX ORDER — OXYCODONE AND ACETAMINOPHEN 5; 325 MG/1; MG/1
1 TABLET ORAL
Status: DISCONTINUED | OUTPATIENT
Start: 2020-07-03 | End: 2020-07-03 | Stop reason: HOSPADM

## 2020-07-03 RX ORDER — PROPOFOL 10 MG/ML
INJECTION, EMULSION INTRAVENOUS AS NEEDED
Status: DISCONTINUED | OUTPATIENT
Start: 2020-07-03 | End: 2020-07-03 | Stop reason: HOSPADM

## 2020-07-03 RX ORDER — ONDANSETRON 2 MG/ML
INJECTION INTRAMUSCULAR; INTRAVENOUS AS NEEDED
Status: DISCONTINUED | OUTPATIENT
Start: 2020-07-03 | End: 2020-07-03 | Stop reason: HOSPADM

## 2020-07-03 RX ORDER — LIDOCAINE HYDROCHLORIDE 10 MG/ML
0.1 INJECTION, SOLUTION EPIDURAL; INFILTRATION; INTRACAUDAL; PERINEURAL AS NEEDED
Status: DISCONTINUED | OUTPATIENT
Start: 2020-07-03 | End: 2020-07-03 | Stop reason: HOSPADM

## 2020-07-03 RX ORDER — DEXAMETHASONE SODIUM PHOSPHATE 4 MG/ML
INJECTION, SOLUTION INTRA-ARTICULAR; INTRALESIONAL; INTRAMUSCULAR; INTRAVENOUS; SOFT TISSUE AS NEEDED
Status: DISCONTINUED | OUTPATIENT
Start: 2020-07-03 | End: 2020-07-03 | Stop reason: HOSPADM

## 2020-07-03 RX ORDER — SODIUM CHLORIDE, SODIUM LACTATE, POTASSIUM CHLORIDE, CALCIUM CHLORIDE 600; 310; 30; 20 MG/100ML; MG/100ML; MG/100ML; MG/100ML
125 INJECTION, SOLUTION INTRAVENOUS CONTINUOUS
Status: DISCONTINUED | OUTPATIENT
Start: 2020-07-03 | End: 2020-07-03 | Stop reason: HOSPADM

## 2020-07-03 RX ORDER — MIDAZOLAM HYDROCHLORIDE 1 MG/ML
INJECTION, SOLUTION INTRAMUSCULAR; INTRAVENOUS AS NEEDED
Status: DISCONTINUED | OUTPATIENT
Start: 2020-07-03 | End: 2020-07-03 | Stop reason: HOSPADM

## 2020-07-03 RX ORDER — HYDROMORPHONE HYDROCHLORIDE 1 MG/ML
0.5 INJECTION, SOLUTION INTRAMUSCULAR; INTRAVENOUS; SUBCUTANEOUS
Status: DISCONTINUED | OUTPATIENT
Start: 2020-07-03 | End: 2020-07-03 | Stop reason: HOSPADM

## 2020-07-03 RX ORDER — SODIUM CHLORIDE, SODIUM LACTATE, POTASSIUM CHLORIDE, CALCIUM CHLORIDE 600; 310; 30; 20 MG/100ML; MG/100ML; MG/100ML; MG/100ML
150 INJECTION, SOLUTION INTRAVENOUS CONTINUOUS
Status: DISCONTINUED | OUTPATIENT
Start: 2020-07-03 | End: 2020-07-03 | Stop reason: HOSPADM

## 2020-07-03 RX ORDER — ONDANSETRON 2 MG/ML
4 INJECTION INTRAMUSCULAR; INTRAVENOUS AS NEEDED
Status: DISCONTINUED | OUTPATIENT
Start: 2020-07-03 | End: 2020-07-03 | Stop reason: HOSPADM

## 2020-07-03 RX ADMIN — PROPOFOL 200 MG: 10 INJECTION, EMULSION INTRAVENOUS at 07:38

## 2020-07-03 RX ADMIN — FENTANYL CITRATE 25 MCG: 0.05 INJECTION, SOLUTION INTRAMUSCULAR; INTRAVENOUS at 07:50

## 2020-07-03 RX ADMIN — FENTANYL CITRATE 25 MCG: 0.05 INJECTION, SOLUTION INTRAMUSCULAR; INTRAVENOUS at 08:33

## 2020-07-03 RX ADMIN — SODIUM CHLORIDE, SODIUM LACTATE, POTASSIUM CHLORIDE, AND CALCIUM CHLORIDE 150 ML/HR: 600; 310; 30; 20 INJECTION, SOLUTION INTRAVENOUS at 06:54

## 2020-07-03 RX ADMIN — FENTANYL CITRATE 25 MCG: 0.05 INJECTION, SOLUTION INTRAMUSCULAR; INTRAVENOUS at 08:37

## 2020-07-03 RX ADMIN — HYDROMORPHONE HYDROCHLORIDE 0.5 MG: 1 INJECTION, SOLUTION INTRAMUSCULAR; INTRAVENOUS; SUBCUTANEOUS at 09:34

## 2020-07-03 RX ADMIN — DEXAMETHASONE SODIUM PHOSPHATE 4 MG: 4 INJECTION, SOLUTION INTRAMUSCULAR; INTRAVENOUS at 07:57

## 2020-07-03 RX ADMIN — LIDOCAINE HYDROCHLORIDE 60 MG: 20 INJECTION, SOLUTION EPIDURAL; INFILTRATION; INTRACAUDAL; PERINEURAL at 07:38

## 2020-07-03 RX ADMIN — FENTANYL CITRATE 25 MCG: 0.05 INJECTION, SOLUTION INTRAMUSCULAR; INTRAVENOUS at 08:35

## 2020-07-03 RX ADMIN — MIDAZOLAM HYDROCHLORIDE 2 MG: 2 INJECTION, SOLUTION INTRAMUSCULAR; INTRAVENOUS at 07:35

## 2020-07-03 RX ADMIN — PROPOFOL 200 MG: 10 INJECTION, EMULSION INTRAVENOUS at 07:42

## 2020-07-03 RX ADMIN — CEFAZOLIN SODIUM 2 G: 1 POWDER, FOR SOLUTION INTRAMUSCULAR; INTRAVENOUS at 07:55

## 2020-07-03 RX ADMIN — OXYCODONE HYDROCHLORIDE AND ACETAMINOPHEN 1 TABLET: 5; 325 TABLET ORAL at 09:56

## 2020-07-03 RX ADMIN — MIDAZOLAM HYDROCHLORIDE 2 MG: 2 INJECTION, SOLUTION INTRAMUSCULAR; INTRAVENOUS at 07:29

## 2020-07-03 RX ADMIN — ONDANSETRON HYDROCHLORIDE 4 MG: 2 SOLUTION INTRAMUSCULAR; INTRAVENOUS at 08:48

## 2020-07-03 NOTE — OP NOTES
[facility] 78  OPERATIVE REPORT    Name:  Anant Fox  MR#:  [id number]  :  1965  ACCOUNT #:  [de-identified]  DATE OF SERVICE:  2020    PREOPERATIVE DIAGNOSES:  1. Right foot hallux limitus, hallux valgus. 2.  Right first interphalangeal joint arthritis. POSTOPERATIVE DIAGNOSES:  1. Right foot hallux limitus, hallux valgus. 2.  Right first interphalangeal joint arthritis. PROCEDURE PERFORMED:  1. Right foot Silver bunionectomy. 2.  Right foot first interphalangeal joint arthrotomy. SURGEON:  Babak Russ DPM    ASSISTANT:  None. ANESTHESIA:  General with 10 mL of 1:1 mixture of 1% lidocaine plain and 0.5% Marcaine plain. COMPLICATIONS:  None. SPECIMENS REMOVED:  None. IMPLANTS:  None. ESTIMATED BLOOD LOSS:  Minimal.    HEMOSTASIS:  Achieved with a pneumatic ankle tourniquet at 250 mmHg. MATERIALS USED:  2-0 and 3-0 Vicryl, 3-0 Prolene    INJECTABLES:  10 mL of 0.5% Marcaine plain    CONDITION:  Stable. INDICATION FOR PROCEDURE:  The patient is a 62-year-old male who presented to the preoperative holding area for right foot pain, he points to the first MPJ and IPJ, which has been ongoing for over one year. He has tried conservative treatment and failed. He would like and require surgical intervention to improve his pain. Upon review of x-ray, there was bone overgrowth of the first MPJ and IPJ with hallux valgus deformity. Procedures were discussed with the patient along with the postoperative course. All risks, complications, and benefits were explained. No guarantees were made to the outcome of the procedure. PROCEDURE:  The patient was identified in the preoperative holding area. Site and side were marked. Consent was signed. The patient was then taken back to the operating room and sedated. Attention was directed to the right lower extremity where the right foot and ankle were sterilely prepped and draped.   Prior to the prep and drape, a right ankle tourniquet was placed on the right ankle. Time-out was performed. A skin marker was used to draw an incision line over the first MPJ and IPJ. The right foot was injected with 10 mL of 1:1 mixture of 1% lidocaine plain and 0.5% Marcaine plain. Esmarch was used to exsanguinate the right foot. Tourniquet was inflated. A 15-blade was used to make the incision over the MPJ and IPJ. Blunt dissection was achieved with the Metzenbaum. All vital structures including neurovascular bundle and tendons were retracted and a new 15-blade was used to incise through the periosteum and capsule. Reflection of the periosteum was performed and good visualization of the first MPJ and IPJ was achieved. At this time, a sagittal saw was then used to resect prominent dorsal and medial eminences of the first MPJ. The area was then irrigated and attention was directed to the first IPJ. A rasp and rongeur were used to resect multiple areas of bony overgrowth at the IPJ. The area was then irrigated and the periosteum sutured with 2-0 Vicryl, skin with 3-0 Vicryl and 3-0 Prolene. The right foot was injected with 10 mL of 0.5% Marcaine plain proximal to the surgical site. The right foot was dressed with Betadine-soaked Adaptic, dry sterile dressing, and Coban for compression. The patient tolerated the procedure well and was transferred to the PACU with stable vitals and his neurovascular status intact. He was given appropriate postoperative instructions to remain nonweightbearing to the right foot. He was given a walker and surgical shoe. He was given postoperative pain medication. He is to follow up with me in one week.       HALLIE Guido/[email or web address]/  D:  07/03/2020 9:36  T:  07/03/2020 13:31  JOB #:  [id number]

## 2020-07-03 NOTE — BRIEF OP NOTE
Brief Postoperative Note    Patient: Linda Esparza  YOB: 1965  MRN: [id number]    Date of Procedure: 7/3/2020     Pre-Op Diagnosis: RIGHT FOOT PAINFUL BUNION and HALLUX INTERPHALANGEAL JOINT WITH ARTHRITIS. Post-Op Diagnosis: Same as preoperative diagnosis. Procedure(s):  RIGHT FOOT SILVER BUNIONECTOMY AND INTERPHALANGEAL JOINT ARTHROTOMY. Surgeon(s):  Alicia Russ DPM    Surgical Assistant: None    Anesthesia: Other     Estimated Blood Loss (mL): less than 50     Complications: None    Specimens: * No specimens in log *     Implants: * No implants in log *    Drains:   [REMOVED] Hemovac Posterior Back (Removed)       Findings: BONE OVERGROWTH RIGHT 1ST MPJ AND IPJ.     Electronically Signed by Valeri Browne DPM on 7/3/2020 at 9:21 AM

## 2020-07-03 NOTE — H&P
History and Physical    Subjective:         Patient is a 63 y.o.  male who is being seen for Right foot pain. Workup has revealed Right foot painful Hallux limitus, HAV and IPJ arthritis. Patient would like surgical procedure to improve his pain. Patient Active Problem List    Diagnosis Date Noted    Left foot pain 05/20/2020    Infection of right prosthetic hip joint ([address] 02/20/2017    Chronic infection of hip joint prosthesis ([address] 11/21/2016    Lumbar stenosis 09/19/2016    Degenerative joint disease of right hip 04/20/2016    Osteoarthritis of left hip 03/02/2016     Past Medical History:   Diagnosis Date    Arthritis     HIPS    Chronic pain     Sleep apnea     IN THE PAST. CURRENTLY NO C-PAP     Past Surgical History:   Procedure Laterality Date    HX HIP REPLACEMENT Left 3/2016    DR. Villatoro ([place])    HX HIP REPLACEMENT Right 2017    HX LUMBAR FUSION  09/18/2016    HX LUMBAR LAMINECTOMY  2010    HX ORTHOPAEDIC Right 11/21/2016    left foot bunionectomy and 1st ipj arthrotomy    HX ORTHOPAEDIC Left 05/20/2020    HX VASECTOMY  2000    HX WISDOM TEETH EXTRACTION  2017    TOTAL HIP ARTHROPLASTY Left 03/02/2016    L HIP REPLACEMENT    TOTAL HIP ARTHROPLASTY Right 04/20/2016    TOTAL HIP       Family History   Problem Relation Age of Onset    Arthritis-osteo Mother     Arthritis-osteo Father     COPD Father     No Known Problems Sister     Anesth Problems Neg Hx     Alcohol abuse Neg Hx       Social History     Tobacco Use    Smoking status: Current Every Day Smoker     Packs/day: 0.25     Years: 15.00     Pack years: 3.75    Smokeless tobacco: Never Used   Substance Use Topics    Alcohol use: Yes     Comment: OCCASIONALLY     No Known Allergies  Prior to Admission medications    Medication Sig Start Date End Date Taking? Authorizing Provider   ibuprofen (MOTRIN) 200 mg tablet Take 4 Tabs by mouth every eight (8) hours as needed for Pain.  5/20/20  Yes Petrona Babak CORONA DPM       Review of Systems     Right foot pain. Objective:     Patient Vitals for the past 8 hrs:   BP Temp Pulse Resp SpO2 Height Weight   20 0704 142/79 98.1 °F (36.7 °C) 64 18 97 %     20 0628      5' 7\" (1.702 m) 106.7 kg (235 lb 3.7 oz)     Temp (24hrs), Av.1 °F (36.7 °C), Min:98.1 °F (36.7 °C), Max:98.1 °F (36.7 °C)      Physical Exam   DP/MN 2/4. CRT < 3 seconds all toes. No open wounds. Pain with palpation and ROM right foot 1st MPJ and IPJ. Neurological status is intact. Xrays - Hallux limitus and arthritis. Data Review: No results found for this or any previous visit (from the past 24 hour(s)). Impression:     HAV, hallus limitus, Arthritis. Recommendation:     Discussed Xrays and procedure in detail with patient. NPO since midnight. Consent signed and in chart. All risks complications and benefits explained. No Guarantees made to the outcome of the procedure. To OR for Right foot silver bunionectomy and IPJ arthrotomy.

## 2020-07-03 NOTE — PERIOP NOTES
Instructions reviewed with spouse. All questions answered. Patient verbalizes readiness to go home at this time.

## 2020-07-03 NOTE — DISCHARGE INSTRUCTIONS
[employer] [id number]  [address]  OFFICE [phone]  CELL    [phone]    You have just had surgery on your foot and/or ankle. Proper care during the post-operative period is an integral part of your surgical treatment program.  It is imperative that these instructions are followed to insure proper healing and to obtain the best results. 1. GO directly home and keep your feet elevated on the way. 2. DRESSING OR CAST - Keep your dressing or cast clean and dry. DO NOT remove the bandage or inspect the wound. A small amount of blood on the bandage is normal.  If the dressing falls off or gets wet, call the office immediately. 3. ELEVATION - Place two pillows under the knee and leg. Make sure to support underneath your knees. You should elevate your leg whenever you are sitting or lying down. This includes mealtime and when retiring for the night. 4. ICE - Apply 1 or 2 small bags of ice close to, BUT NOT DIRECTLY OVER, the surgical site. The ice should be ON FOR TWENTY MINUTES, THEN OFF FOR ONE HALF HOUR. Continue this sequence throughout the day. Remember to keep the dressing or cast dry. Double-bagging the ice will help. 5. Limited pain and swelling is expected. 6. Exercise your legs frequently by bending your knees to stimulate circulation and speed healing. 7. You are to be:   · NON-WEIGHT BEARING - you are not allowed to touch your foot to the floor and/or ground. You must use crutches or a walker at all times. 8. If you have a surgical shoe - it should be worn whenever you are standing or walking. 9. MEALS - Your first meal at home should be a light one such as toast or soup. If nausea and vomiting develop call the office immediately. Drink plenty of fluid and resume a well balanced diet. 10. Sponge baths are recommended until your first post-operative appointment.   11. PRESCRIPTIONS - Please fill and take as directed. If you have any difficulties or experience any side effects after taking this medication please discontinue and call the office and/or go to your closest Emergency Room immediately. Call our office to make your next appointment; Also, if you have any of the following:  · Temperature above 100.5 degrees  · Your bandage becomes overly stained, falls off or gets wet  · You bump or injure the surgical site  · Your medication does not stop discomfort    WE WANT YOUR SURGERY AND RECOVERY TO BE SUCCESSFUL AND AS COMFORTABLE AS POSSIBLE. THANK YOU FOR FOLLOWING THESE INSTRUCTIONS. IF YOU HAVE ANY PROBLEMS OR QUESTIONS PLEASE CALL OUR OFFICE. DISCHARGE SUMMARY from your Nurse      PATIENT INSTRUCTIONS    After general anesthesia or intravenous sedation, for 24 hours or while taking prescription Narcotics:  · Limit your activities  · Do not drive and operate hazardous machinery  · Do not make important personal or business decisions  · Do  not drink alcoholic beverages  · If you have not urinated within 8 hours after discharge, please contact your surgeon on call. Report the following to your surgeon:  · Excessive pain, swelling, redness or odor of or around the surgical area  · Temperature over 100.5  · Nausea and vomiting lasting longer than 4 hours or if unable to take medications  · Any signs of decreased circulation or nerve impairment to extremity: change in color, persistent  numbness, tingling, coldness or increase pain  · Any questions      GOOD HELP TO FIGHT AN INFECTION  Here are a few tip to help reduce the chance of getting an infection after surgery:   Wash Your Hands   Good handwashing is the most important thing you and your caregiver can do.  Wash before and after caring for any wounds. Dry your hand with a clean towel.  Wash with soap and water for at least 20 seconds. A TIP: sing the \"Happy Birthday\" song through one time while washing to help with the timing.    Use a hand  in between washings.  Shower   When your surgeon says it is OK to take a shower, use a new bar of antibacterial soap (if that is what you use, and keep that bar of soap ONLY for your use), or antibacterial body wash.  Use a clean wash cloth or sponge when you bathe.  Dry off with a clean towel  after every bath - be careful around any wounds, skin staples, sutures or surgical glue over/on wounds.  Do not enter swimming pools, hot tubs, lakes, rivers and/or ocean until wounds are healed and your doctor/surgeon says it is OK.  Use Clean Sheets   Sleep on freshly laundered sheets after your surgery.  Keep the surgery site covered with a clean, dry bandage (if instructed to do so). If the bandage becomes soiled, reapply a new, dry, clean bandage.  Do not allow pets to sleep with you while your wound is healing.  Lifestyle Modification and Controlling Your Blood Sugar   Smoking slows wound healing. Stop smoking and limit exposure to second-hand smoke.  High blood sugar slows wound healing. Eat a well-balanced diet to provide proper nutrition while healing   Monitor your blood sugar (if you are a diabetic) and take your medications as you are suppose to so you can control you blood sugar after surgery. COUGH AND DEEP BREATHE    Breathing deeply and coughing are very important exercises to do after surgery. Deep breathing and coughing open the little air tubes and air sacks in your lungs. You take deep breaths every day. You may not even notice - it is just something you do when you sigh or yawn. It is a natural exercise you do to keep these air passages open. After surgery, take deep breaths and cough, on purpose. DIRECTIONS:  · Take 10 to 15 slow deep breaths every hour while awake. · Breathe in deeply, and hold it for 2 seconds. · Exhale slowly through puckered lips, like blowing up a balloon.   · After every 4th or 5th deep breath, hug your pillow to your chest or belly and give a hard, deep cough. Yes, it will probably hurt. But doing this exercise is a very important part of healing after surgery. Take your pain medicine to help you do this exercise without too much pain. Coughing and deep breathing help prevent bronchitis and pneumonia after surgery. If you had chest or belly surgery, use a pillow as a \"hug faustino\" and hold it tightly to your chest or belly when you cough. ANKLE PUMPS    Ankle pumps increase the circulation of oxygenated blood to your lower extremities and decrease your risk for circulation problems such as blood clots. They also stretch the muscles, tendons and ligaments in your foot and ankle, and prevent joint contracture in the ankle and foot, especially after surgeries on the legs. It is important to do ankle pump exercises regularly after surgery because immobility increases your risk for developing a blood clot. Your doctor may also have you take an Aspirin for the next few days as well. If your doctor did not ask you to take an Aspirin, consult with him before starting Aspirin therapy on your own. The exercise is quite simple. · Slowly point your foot forward, feeling the muscles on the top of your lower leg stretch, and hold this position for 5 seconds. · Next, pull your foot back toward you as far as possible, stretching the calf muscles, and hold that position for 5 seconds. · Repeat with the other foot. · Perform 10 repetitions every hour while awake for both ankles if possible (down and then up with the foot once is one repetition). You should feel gentle stretching of the muscles in your lower leg when doing this exercise. If you feel pain, or your range of motion is limited, don't push too hard. Only go the limit your joint and muscles will let you go.   If you have increasing pain, progressively worsening leg warmth or swelling, STOP the exercise and call your doctor. MEDICATION AND   SIDE EFFECT GUIDE    [facility] MEDICATION AND SIDE EFFECT GUIDE was provided to the PATIENT AND CARE PROVIDER. Information provided includes instruction about drug purpose and common side effects for the following medications:   ·         These are general instructions for a healthy lifestyle:    *   Please give a list of your current medications to your Primary Care Provider. *   Please update this list whenever your medications are discontinued, doses are changed, or new medications (including over-the-counter products) are added. *   Please carry medication information at all times in case of emergency situations. About Smoking  No smoking / No tobacco products  Avoid exposure to second hand smoke     Surgeon General's Warning:  Quitting smoking now greatly reduces serious risk to your health. Obesity, smoking, and sedentary lifestyle greatly increases your risk for illness and disease. A healthy diet, regular physical exercise & weight monitoring are important for maintaining a healthy lifestyle. Congestive Heart Failure  You may be retaining fluid if you have a history of heart failure or if you experience any of the following symptoms:  Weight gain of 3 pounds or more overnight or 5 pounds in a week, increased swelling in your hands or feet or shortness of breath while lying flat in bed. Please call your doctor as soon as you notice any of these symptoms; do not wait until your next office visit. Recognize signs and symptoms of STROKE:  F -  Face looks uneven  A -  Arms unable to move or move evenly  S -  Speech slurred or non-existent  T -  Time-call 911 as soon as signs and symptoms begin-DO NOT go          back to bed or wait to see if you get better-TIME IS BRAIN. Warning Signs of HEART ATTACK   Call 911 if you have these symptoms:     Chest discomfort.  Most heart attacks involve discomfort in the center of the chest that lasts more than a few minutes, or that goes away and comes back. It can feel like uncomfortable pressure, squeezing, fullness, or pain.  Discomfort in other areas of the upper body. Symptoms can include pain or discomfort in one or both arms, the back, neck, jaw, or stomach.  Shortness of breath with or without chest discomfort.  Other signs may include breaking out in a cold sweat, nausea, or lightheadedness. Don't wait more than five minutes to call 911 - MINUTES MATTER! Fast action can save your life. Calling 911 is almost always the fastest way to get lifesaving treatment. Emergency Medical Services staff can begin treatment when they arrive -- up to an hour sooner than if someone gets to the hospital by car. Learning About Coronavirus [phone])  Coronavirus [phone]): Overview  What is coronavirus (COVID-19)? The coronavirus disease (COVID-19) is caused by a virus. It is an illness that was first found in [place], [place], in December 2019. It has since spread worldwide. The virus can cause fever, cough, and trouble breathing. In severe cases, it can cause pneumonia and make it hard to breathe without help. It can cause death. Coronaviruses are a large group of viruses. They cause the common cold. They also cause more serious illnesses like Middle East respiratory syndrome (MERS) and severe acute respiratory syndrome (SARS). COVID-19 is caused by a novel coronavirus. That means it's a new type that has not been seen in people before. This virus spreads person-to-person through droplets from coughing and sneezing. It can also spread when you are close to someone who is infected. And it can spread when you touch something that has the virus on it, such as a doorknob or a tabletop. What can you do to protect yourself from coronavirus (COVID-19)? The best way to protect yourself from getting sick is to:  · Avoid areas where there is an outbreak. · Avoid contact with people who may be infected.   · Wash your hands often with soap or alcohol-based hand sanitizers. · Avoid crowds and try to stay at least 6 feet away from other people. · Wash your hands often, especially after you cough or sneeze. Use soap and water, and scrub for at least 20 seconds. If soap and water aren't available, use an alcohol-based hand . · Avoid touching your mouth, nose, and eyes. What can you do to avoid spreading the virus to others? To help avoid spreading the virus to others:  · Cover your mouth with a tissue when you cough or sneeze. Then throw the tissue in the trash. · Use a disinfectant to clean things that you touch often. · Stay home if you are sick or have been exposed to the virus. Don't go to school, work, or public areas. And don't use public transportation. · If you are sick:  ? Leave your home only if you need to get medical care. But call the doctor's office first so they know you're coming. And wear a face mask, if you have one.  ? If you have a face mask, wear it whenever you're around other people. It can help stop the spread of the virus when you cough or sneeze. ? Clean and disinfect your home every day. Use household  and disinfectant wipes or sprays. Take special care to clean things that you grab with your hands. These include doorknobs, remote controls, phones, and handles on your refrigerator and microwave. And don't forget countertops, tabletops, bathrooms, and computer keyboards. When to call for help  Call 911 anytime you think you may need emergency care. For example, call if:  · You have severe trouble breathing. (You can't talk at all.)  · You have constant chest pain or pressure. · You are severely dizzy or lightheaded. · You are confused or can't think clearly. · Your face and lips have a blue color. · You pass out (lose consciousness) or are very hard to wake up. Call your doctor now if you develop symptoms such as:  · Shortness of breath. · Fever. · Cough.   If you need to get care, call ahead to the doctor's office for instructions before you go. Make sure you wear a face mask, if you have one, to prevent exposing other people to the virus. Where can you get the latest information? The following health organizations are tracking and studying this virus. Their websites contain the most up-to-date information. Thalia Famp also learn what to do if you think you may have been exposed to the virus. · U.S. Centers for Disease Control and Prevention (CDC): The CDC provides updated news about the disease and travel advice. The website also tells you how to prevent the spread of infection. www.cdc.gov  · World Health Organization [facility]): WHO offers information about the virus outbreaks. WHO also has travel advice. www.who.int  Current as of: April 1, 2020               Content Version: 12.4  © 2006-2020 [employer]. Care instructions adapted under license by your healthcare professional. If you have questions about a medical condition or this instruction, always ask your healthcare professional. [address] any warranty or liability for your use of this information. The discharge information has been reviewed with the spouse. Any questions and concerns from the spouse have been addressed. The spouse verbalized understanding. Other information in your discharge envelope:  []     PRESCRIPTIONS  []     PHYSICAL THERAPY PRESCRIPTION  []     APPOINTMENT CARDS  []     Regional Anesthesia Pamphlet for block or block with On-Q Catheter from   Anesthesia Service  []     Medical device information sheets/pamphlets from their    []     School/work excuse note. []     /parent work excuse note.         The following personal items collected during your admission are returned to you:   Dental Appliance: Dental Appliances: None  Vision: Visual Aid: Glasses, At home(readers)  Hearing Aid:    Jewelry: Jewelry: None  Clothing: Clothing: Other (comment)(street clothes to locker)  Other Valuables:  Other Valuables: Cell Phone(to wife)  Valuables sent to safe:

## 2020-07-03 NOTE — ANESTHESIA PREPROCEDURE EVALUATION
Relevant Problems   No relevant active problems       Anesthetic History   No history of anesthetic complications            Review of Systems / Medical History  Patient summary reviewed, nursing notes reviewed and pertinent labs reviewed    Pulmonary        Sleep apnea (recent weight loss):  No treatment           Neuro/Psych   Within defined limits           Cardiovascular  Within defined limits                Exercise tolerance: >4 METS     GI/Hepatic/Renal  Within defined limits              Endo/Other        Arthritis     Other Findings              Physical Exam    Airway  Mallampati: II  TM Distance: 4 - 6 cm  Neck ROM: normal range of motion   Mouth opening: Normal     Cardiovascular    Rhythm: regular  Rate: normal         Dental  No notable dental hx       Pulmonary  Breath sounds clear to auscultation               Abdominal         Other Findings            Anesthetic Plan    ASA: 2  Anesthesia type: general          Induction: Intravenous  Anesthetic plan and risks discussed with: Patient

## 2020-07-03 NOTE — ANESTHESIA POSTPROCEDURE EVALUATION
Procedure(s):  RIGHT FOOT SILVER BUNIONECTOMY AND INTERPHALANGEAL JOINT (URGENT) (EIP 0730) IV SEDATION. general    Anesthesia Post Evaluation      Multimodal analgesia: multimodal analgesia not used between 6 hours prior to anesthesia start to PACU discharge  Patient location during evaluation: PACU  Patient participation: complete - patient participated  Level of consciousness: awake  Pain management: adequate  Airway patency: patent  Anesthetic complications: no  Cardiovascular status: acceptable, blood pressure returned to baseline and hemodynamically stable  Respiratory status: acceptable  Hydration status: acceptable  Post anesthesia nausea and vomiting:  controlled      INITIAL Post-op Vital signs:   Vitals Value Taken Time   /92 7/3/2020  9:40 AM   Temp 36.4 °C (97.5 °F) 7/3/2020  9:27 AM   Pulse 68 7/3/2020  9:43 AM   Resp 15 7/3/2020  9:43 AM   SpO2 99 % 7/3/2020  9:43 AM   Vitals shown include unvalidated device data.

## 2022-03-01 ENCOUNTER — OFFICE VISIT (OUTPATIENT)
Dept: ORTHOPEDIC SURGERY | Age: 57
End: 2022-03-01
Payer: COMMERCIAL

## 2022-03-01 VITALS — HEIGHT: 67 IN | WEIGHT: 275 LBS | BODY MASS INDEX: 43.16 KG/M2

## 2022-03-01 DIAGNOSIS — M54.42 CHRONIC BILATERAL LOW BACK PAIN WITH BILATERAL SCIATICA: ICD-10-CM

## 2022-03-01 DIAGNOSIS — Z98.1 S/P SPINAL FUSION: Primary | ICD-10-CM

## 2022-03-01 DIAGNOSIS — M54.41 CHRONIC BILATERAL LOW BACK PAIN WITH BILATERAL SCIATICA: ICD-10-CM

## 2022-03-01 DIAGNOSIS — G89.29 CHRONIC BILATERAL LOW BACK PAIN WITH BILATERAL SCIATICA: ICD-10-CM

## 2022-03-01 DIAGNOSIS — M51.36 DDD (DEGENERATIVE DISC DISEASE), LUMBAR: ICD-10-CM

## 2022-03-01 PROCEDURE — 99203 OFFICE O/P NEW LOW 30 MIN: CPT | Performed by: ORTHOPAEDIC SURGERY

## 2022-03-01 RX ORDER — IBUPROFEN 200 MG
TABLET ORAL
COMMUNITY

## 2022-03-01 NOTE — LETTER
3/3/2022    Patient: Rick Solis   YOB: 1965   Date of Visit: 3/1/2022     Sofiya Pringle MD  [address] [address]  Via Fax: [phone]    Dear Sofiya Pringle MD,      Thank you for referring Mr. Maik Yañez to [facility] for evaluation. My notes for this consultation are attached. If you have questions, please do not hesitate to call me. I look forward to following your patient along with you.       Sincerely,    Alyssa Ochoa MD

## 2022-03-03 PROBLEM — M54.42 CHRONIC BILATERAL LOW BACK PAIN WITH BILATERAL SCIATICA: Status: ACTIVE | Noted: 2022-03-03

## 2022-03-03 PROBLEM — M51.36 DDD (DEGENERATIVE DISC DISEASE), LUMBAR: Status: ACTIVE | Noted: 2022-03-03

## 2022-03-03 PROBLEM — M54.41 CHRONIC BILATERAL LOW BACK PAIN WITH BILATERAL SCIATICA: Status: ACTIVE | Noted: 2022-03-03

## 2022-03-03 PROBLEM — G89.29 CHRONIC BILATERAL LOW BACK PAIN WITH BILATERAL SCIATICA: Status: ACTIVE | Noted: 2022-03-03

## 2022-03-03 NOTE — PROGRESS NOTES
Yareli Luna (: 1965) is a 61 y.o. male, patient, here for evaluation of the following chief complaint(s):  LOW BACK PAIN       ASSESSMENT/PLAN:  Below is the assessment and plan developed based on review of pertinent history, physical exam, labs, studies, and medications. Patient presents today as a prior patient to the practice, with prior lumbar laminectomies and lumbar fusions performed by Dr. Ernestina Urbina in  and  respectively. Radiologic findings reviewed in detail with the patient. No weakness noted on physical exam.  At this point, I would like for him to continue his conservative management. Referral to physical therapy provided today. Discussed alternative medications, but patient would prefer to continue with Advil for now. Patient will be bringing in [facility] paperwork for time to adequately complete physical therapy. He will follow up in 4 to 6 weeks, at which point we can get a lumbar MRI if he does not have any improvement in his symptoms. 1. S/P spinal fusion  -     XR SPINE LUMB MIN 4 V; Future  -     REFERRAL TO PHYSICAL THERAPY; Future  2. Chronic bilateral low back pain with bilateral sciatica  3. DDD (degenerative disc disease), lumbar      No follow-ups on file. SUBJECTIVE/OBJECTIVE:  Yareli Luna (: 1965) is a 61 y.o. male. No flowsheet data found. Patient presents today as a prior patient to the practice, he states he had a lumbar laminectomy performed by Dr. Ernestina Urbina in , and a subsequent lumbar fusion performed in  by Dr. Ernestina Urbina. He presents today with complaints of worsening bilateral low back pain, with intermittent radiation into bilateral posterior buttock and upper thigh. His symptoms do not go below his knees. He also reports some numbness intermittently in the same distribution. He reports that he sits all day at his work, and is unable to consistently stand due to the nature of his job.   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 His symptoms are better when he has the ability to stand up between answering phone calls. His symptoms are worse with prolonged periods of sitting. He denies any changes in bowel or bladder control. Denies any leg weakness. He is currently taking Advil as needed with some relief of his symptoms. Imaging:    XR Results (most recent):  Results from Appointment encounter on 03/01/22    XR SPINE LUMB MIN 4 V    Narrative  AP and lateral of the lumbar spine with flexion-extension reviewed today. He has a stable L3-L5 fusion but is developed a lumbar scoliosis above the spondylosis particular at L2-3. No fractures or lytic lesions are noted. MRI Results (most recent):  Results from [place] encounter on 11/20/16    MRI LUMB SPINE W WO CONT    Narrative  EXAM:  MRI LUMB SPINE W WO CONT  Clinical history: Back pain      INDICATION:  Lumbar stenosis with neurogenic claudication. Spinal stenosis,  lumbar region    COMPARISON: None    TECHNIQUE: MR imaging of the lumbar spine was performed using the following  sequences: sagittal T1, T2, STIR;  axial T1, T2 prior to and following contrast  administration. CONTRAST: 12 mL of Gadavist.    FINDINGS:    There is normal alignment of the lumbar spine. Vertebral body heights are  maintained. Marrow signal is normal. Subcutaneous collection is typical  postprocedural state. Discogenic marrow degenerative changes. Abdominal aorta is  normal in caliber. Proximal common iliac vessels are normal in caliber. There is  mild levoscoliosis. There is a right renal cyst.    The conus medullaris terminates at L1. Signal and caliber of the distal spinal  cord are within normal limits. There is no pathologic intrathecal enhancement. The paraspinal soft tissues are within normal limits. Lower thoracic spine: No herniation or stenosis. L1-L2:  Facet arthropathy and hypertrophy. Disc desiccation and disc bulge. Canal is patent. Foramina are patent]    L2-L3:  Facet arthropathy and hypertrophy. There is a moderate right lateral  recess and right foraminal protrusion. Minimal superimposed caudally oriented  extrusion. The canal is patent. The foramina are patent. Effacement of nerve  roots extending to the right L3-L4 foramen. L3-L4:  Laminectomy and fusion. No unusual postprocedural findings. Canal and  foramina are patent    L4-L5:  Laminectomy fusion. No unusual postprocedural findings. Canal is patent. Foramina are patent    L5-S1:  Facet arthropathy. Canal is patent. Moderate left and mild to moderate  right foraminal stenosis. Impression  IMPRESSION:  Patient is status post laminectomy fusion at L3-4 and L4-5. No unusual  postprocedural findings at the laminectomy fusion levels. There is a moderate right lateral recess protrusion at L2-3, minimal  superimposed right lateral recess extrusion extends posterior to the L3  vertebral level. There is effacement of nerve roots extending to the right L3-L4  foramen. Moderate left and mild to moderate right foraminal stenosis at L5-S1. No Known Allergies    Current Outpatient Medications   Medication Sig    ibuprofen (AdviL) 200 mg tablet 4 tabs    ibuprofen (MOTRIN) 200 mg tablet Take 4 Tabs by mouth every eight (8) hours as needed for Pain. (Patient not taking: Reported on 3/1/2022)     No current facility-administered medications for this visit. Past Medical History:   Diagnosis Date    Arthritis     HIPS    Chronic pain     Sleep apnea     IN THE PAST. CURRENTLY NO C-PAP        Past Surgical History:   Procedure Laterality Date    HX HIP REPLACEMENT Left 3/2016    DR. Villatoro ([place])    HX HIP REPLACEMENT Right 2017    HX LUMBAR FUSION  09/18/2016    HX LUMBAR LAMINECTOMY  2010    HX ORTHOPAEDIC Right 11/21/2016    left foot bunionectomy and 1st ipj arthrotomy    HX ORTHOPAEDIC Left 05/20/2020    HX VASECTOMY  2000    HX WISDOM TEETH EXTRACTION 2017    [place] TOTAL HIP ARTHROPLASTY Left 03/02/2016    L HIP REPLACEMENT    [place] TOTAL HIP ARTHROPLASTY Right 04/20/2016    TOTAL HIP       Family History   Problem Relation Age of Onset    OSTEOARTHRITIS Mother     OSTEOARTHRITIS Father     COPD Father     No Known Problems Sister     Anesth Problems Neg Hx     Alcohol abuse Neg Hx         Social History     Tobacco Use    Smoking status: Current Every Day Smoker     Packs/day: 0.25     Years: 15.00     Pack years: 3.75    Smokeless tobacco: Never Used   Substance Use Topics    Alcohol use: Not Currently    Drug use: Yes     Types: Marijuana     Comment: Daily--5/19/2020        Review of Systems   Musculoskeletal: Positive for back pain and myalgias. Neurological: Positive for numbness. All other systems reviewed and are negative. Vitals:  Ht 5' 7\" (1.702 m)   Wt 275 lb (124.7 kg)   BMI 43.07 kg/m²    Body mass index is 43.07 kg/m². Physical Exam  Neurologic  Sensory  Light Touch - Intact - Globally. Overall Assessment of Muscle Strength and Tone reveals  Lower Extremities - Right Iliopsoas - 5/5. Left Iliopsoas - 5/5. Right Tibialis Anterior - 5/5. Left Tibialis Anterior - 5/5. Right Gastroc-Soleus - 5/5. Left Gastroc-Soleus - 5/5. Right EHL - 5/5. Left EHL - 5/5. General Assessment of Reflexes  Right Ankle - Clonus is not present. Left Ankle - Clonus is not present. Reflexes (Dermatomes)  2/2 Normal - Left Achilles (L5-S2), Left Knee (L2-4), Right Achilles (L5-S2) and Right Knee (L2-4). Musculoskeletal  Global Assessment  Examination of related systems reveals - well-developed, well-nourished, in no acute distress, alert and oriented x 3. Gait and Station - normal gait and station and normal posture. Right Lower Extremity - normal strength and tone, normal range of motion without pain and no instability, subluxation or laxity.  Left Lower Extremity - normal strength and tone, normal range of motion without pain and no instability, subluxation or laxity. Spine/Ribs/Pelvis  Cervical Spine - Examination of the cervical spine reveals - no tenderness to palpation, no pain, no swelling, edema or erythema, normal cervical spine movements and normal sensation. Thoracic (Dorsal) Spine - Examination of the thoracic spine reveals - no tenderness over thoracic vertebrae, no pain, normal sensation and normal thoracic spine movements. Lumbosacral Spine - Examination of the lumbosacral spine reveals - no known fractures or deformities. Inspection and Palpation - Tenderness - moderate. Assessment of pain reveals the following findings - The pain is characterized as - moderate. Location - pain refers to lower back bilaterally. ROJM - Trunk Extension - 15 degrees. Lumbar Spine Flexion - 35 °. Lumbosacral Spine - Functional Testing - Babinski Test negative, Prone Knee Bending Test negative, Slump Test negative, Straight Leg Raising Test negative. Dr. Vance Cochran was available for immediate consult during this encounter. An electronic signature was used to authenticate this note.   -- HALIE Burk

## 2022-03-18 PROBLEM — M51.36 DDD (DEGENERATIVE DISC DISEASE), LUMBAR: Status: ACTIVE | Noted: 2022-03-03

## 2022-03-19 PROBLEM — T84.51XA INFECTION OF RIGHT PROSTHETIC HIP JOINT (HCC): Status: ACTIVE | Noted: 2017-02-20

## 2022-03-19 PROBLEM — M79.672 LEFT FOOT PAIN: Status: ACTIVE | Noted: 2020-05-20

## 2022-03-19 PROBLEM — M54.41 CHRONIC BILATERAL LOW BACK PAIN WITH BILATERAL SCIATICA: Status: ACTIVE | Noted: 2022-03-03

## 2022-03-19 PROBLEM — M54.42 CHRONIC BILATERAL LOW BACK PAIN WITH BILATERAL SCIATICA: Status: ACTIVE | Noted: 2022-03-03

## 2022-03-19 PROBLEM — G89.29 CHRONIC BILATERAL LOW BACK PAIN WITH BILATERAL SCIATICA: Status: ACTIVE | Noted: 2022-03-03

## 2023-05-12 RX ORDER — IBUPROFEN 200 MG
TABLET ORAL EVERY 8 HOURS PRN
COMMUNITY
Start: 2020-05-20

## (undated) DEVICE — 3M™ IOBAN™ 2 ANTIMICROBIAL INCISE DRAPE 6651EZ: Brand: IOBAN™ 2

## (undated) DEVICE — PACK,BASIC,SIRUS,V: Brand: MEDLINE

## (undated) DEVICE — COVER LT HNDL PLAS RIG 1 PER PK

## (undated) DEVICE — STANDARD SURGICAL GOWN, L: Brand: CONVERTORS

## (undated) DEVICE — DRAPE,U/ SHT,SPLIT,PLAS,STERIL: Brand: MEDLINE

## (undated) DEVICE — Device

## (undated) DEVICE — SWAB CULT DBL W/O CHAR RAYON TIP AMIES GEL CLMN FOR COLL

## (undated) DEVICE — TOOL F3/9TA30 LEGEND 3.0MM TAPER: Brand: MIDAS REX™

## (undated) DEVICE — STOCKINETTE,IMPERVIOUS,12X48,STERILE: Brand: MEDLINE

## (undated) DEVICE — PREP SKN PREVAIL 40ML APPL --

## (undated) DEVICE — MASTISOL ADHESIVE LIQ 2/3ML

## (undated) DEVICE — REM POLYHESIVE ADULT PATIENT RETURN ELECTRODE: Brand: VALLEYLAB

## (undated) DEVICE — STRAP,POSITIONING,KNEE/BODY,FOAM,4X60": Brand: MEDLINE

## (undated) DEVICE — 1010 S-DRAPE TOWEL DRAPE 10/BX: Brand: STERI-DRAPE™

## (undated) DEVICE — DRAPE SHT 3 QTR PROXIMA 53X77 --

## (undated) DEVICE — Z DISCONTINUED USE 2744636  DRESSING AQUACEL 14 IN ALG W3.5XL14IN POLYUR FLM CVR W/ HYDRCOLL

## (undated) DEVICE — SOLUTION IRRIG 3000ML 0.9% SOD CHL FLX CONT 0797208] ICU MEDICAL INC]

## (undated) DEVICE — INTENDED FOR TISSUE SEPARATION, AND OTHER PROCEDURES THAT REQUIRE A SHARP SURGICAL BLADE TO PUNCTURE OR CUT.: Brand: BARD-PARKER ® CARBON RIB-BACK BLADES

## (undated) DEVICE — SUTURE VCRL SZ 3-0 L27IN ABSRB UD L19MM PS-2 3/8 CIR PRIM J427H

## (undated) DEVICE — STRIP SKIN CLSR W0.25XL4IN WHT SPUNBOUND FBR NYL HI ADH

## (undated) DEVICE — SPONGE GZ W4XL4IN COT 12 PLY TYP VII WVN C FLD DSGN

## (undated) DEVICE — SOLUTION IV 1000ML 0.9% SOD CHL

## (undated) DEVICE — ROCKER SWITCH PENCIL BLADE ELECTRODE, HOLSTER: Brand: EDGE

## (undated) DEVICE — SCRUB DRY SURG EZ SCRUB BRUSH PREOPERATIVE GRN

## (undated) DEVICE — TRAY CATH 16F URIN MTR LTX -- CONVERT TO ITEM 363111

## (undated) DEVICE — SYR 10ML LUER LOK 1/5ML GRAD --

## (undated) DEVICE — BANDAGE,GAUZE,CONFORMING,3"X75",STRL,LF: Brand: MEDLINE

## (undated) DEVICE — ZIMMER® STERILE DISPOSABLE TOURNIQUET CUFF WITH PROTECTIVE SLEEVE AND PLC, DUAL PORT, SINGLE BLADDER, 18 IN. (46 CM)

## (undated) DEVICE — BLADE SURG 19.1X70X1.6MM SAW SAG STRYKR 1MM CUT THICKNESS S

## (undated) DEVICE — DRAIN KT WND 10FR RND 400ML --

## (undated) DEVICE — ABDUCTION PILLOW,MEDIUM: Brand: DEVON

## (undated) DEVICE — PADDING CST 4INX4YD --

## (undated) DEVICE — BLADE SAW OSC COARSE 25X9MM --

## (undated) DEVICE — SUT PROL 3-0 18IN PS2 BLU --

## (undated) DEVICE — TAPE CST LT 4INX4YD FBRGLS WHT --

## (undated) DEVICE — SURGICAL PROCEDURE PACK BASIN MAJ SET CUST NO CAUT

## (undated) DEVICE — BANDAGE,GAUZE,BULKEE II,4.5"X4.1YD,STRL: Brand: MEDLINE

## (undated) DEVICE — T4 HOOD

## (undated) DEVICE — SUTURE VCRL SZ 2-0 L27IN ABSRB UD L26MM SH 1/2 CIR J417H

## (undated) DEVICE — SUTURE NONABSORBABLE MONOFILAMENT 4-0 PS-2 18 IN BLU PROLENE 8682H

## (undated) DEVICE — SUTURE VCRL SZ 2-0 L36IN ABSRB UD L40MM CT 1/2 CIR J957H

## (undated) DEVICE — DERMABOND SKIN ADH 0.7ML -- DERMABOND ADVANCED 12/BX

## (undated) DEVICE — STERILE POLYISOPRENE POWDER-FREE SURGICAL GLOVES WITH EMOLLIENT COATING: Brand: PROTEXIS

## (undated) DEVICE — (D)PREP SKN CHLRAPRP APPL 26ML -- CONVERT TO ITEM 371833

## (undated) DEVICE — BLADE ELECTRODE: Brand: EDGE

## (undated) DEVICE — DRAPE,REIN 53X77,STERILE: Brand: MEDLINE

## (undated) DEVICE — PREP KIT PEEL PTCH POVIDONE IOD

## (undated) DEVICE — BANDAGE,ELASTIC,ESMARK,STERILE,4"X9',LF: Brand: MEDLINE

## (undated) DEVICE — STERILE POLYISOPRENE POWDER-FREE SURGICAL GLOVES: Brand: PROTEXIS

## (undated) DEVICE — HEWSON SUTURE RETRIEVER: Brand: HEWSON SUTURE RETRIEVER

## (undated) DEVICE — TUBING SUCT 10FR MAL ALUM SHFT FN CAP VENT UNIV CONN W/ OBT

## (undated) DEVICE — PREP SKN CHLRAPRP APL 26ML STR --

## (undated) DEVICE — HANDPIECE SET WITH BONE CLEANING TIP AND SUCTION TUBE: Brand: INTERPULSE

## (undated) DEVICE — SLIM BODY SKIN STAPLER: Brand: APPOSE ULC

## (undated) DEVICE — 3000CC GUARDIAN II: Brand: GUARDIAN

## (undated) DEVICE — DEVON™ KNEE AND BODY STRAP 60" X 3" (1.5 M X 7.6 CM): Brand: DEVON

## (undated) DEVICE — DRAPE C ARM W54XL84IN MINI FOR OEC 6800

## (undated) DEVICE — SUTURE MCRYL SZ 4-0 L27IN ABSRB UD L19MM PS-2 1/2 CIR PRIM Y426H

## (undated) DEVICE — Z INACTIVE NO USAGE TURNOVER KIT RM CLEANOP

## (undated) DEVICE — NEEDLE HYPO 25GA L1.5IN BVL ORIENTED ECLIPSE

## (undated) DEVICE — DRAPE,EXTREMITY,89X128,STERILE: Brand: MEDLINE

## (undated) DEVICE — SUTURE VCRL SZ 2 L54IN ABSRB UD L65MM TP-1 1/2 CIR J880T

## (undated) DEVICE — PAD 05IN BASE 3IN PEAK M DENS CONVOLUTED FOAM

## (undated) DEVICE — COVER,MAYO STAND,STERILE: Brand: MEDLINE

## (undated) DEVICE — SOLUTION IRRIG 1000ML H2O STRL BLT

## (undated) DEVICE — Z CONVERTED USE 2271043 CONTAINER SPEC COLL 4OZ SCR ON LID PEEL PCH